# Patient Record
Sex: MALE | Race: ASIAN | NOT HISPANIC OR LATINO
[De-identification: names, ages, dates, MRNs, and addresses within clinical notes are randomized per-mention and may not be internally consistent; named-entity substitution may affect disease eponyms.]

---

## 2017-05-01 ENCOUNTER — RX RENEWAL (OUTPATIENT)
Age: 57
End: 2017-05-01

## 2017-11-28 ENCOUNTER — LABORATORY RESULT (OUTPATIENT)
Age: 57
End: 2017-11-28

## 2017-11-28 ENCOUNTER — APPOINTMENT (OUTPATIENT)
Dept: INTERNAL MEDICINE | Facility: CLINIC | Age: 57
End: 2017-11-28
Payer: COMMERCIAL

## 2017-11-28 VITALS
WEIGHT: 151 LBS | SYSTOLIC BLOOD PRESSURE: 124 MMHG | BODY MASS INDEX: 23.7 KG/M2 | DIASTOLIC BLOOD PRESSURE: 60 MMHG | OXYGEN SATURATION: 99 % | HEIGHT: 67 IN | HEART RATE: 72 BPM | TEMPERATURE: 97.8 F

## 2017-11-28 LAB
APPEARANCE: CLEAR
BACTERIA: NEGATIVE
BILIRUBIN URINE: NEGATIVE
BLOOD URINE: NEGATIVE
CHOLEST SERPL-MCNC: 172 MG/DL
CHOLEST/HDLC SERPL: 3.6 RATIO
COLOR: YELLOW
GLUCOSE QUALITATIVE U: NEGATIVE MG/DL
HDLC SERPL-MCNC: 48 MG/DL
HYALINE CASTS: 2 /LPF
KETONES URINE: NEGATIVE
LDLC SERPL CALC-MCNC: 89 MG/DL
LEUKOCYTE ESTERASE URINE: NEGATIVE
MICROSCOPIC-UA: NORMAL
NITRITE URINE: NEGATIVE
PH URINE: 5.5
PROTEIN URINE: NEGATIVE MG/DL
PSA SERPL-MCNC: 1.31 NG/ML
RED BLOOD CELLS URINE: 4 /HPF
SPECIFIC GRAVITY URINE: 1.03
SQUAMOUS EPITHELIAL CELLS: 1 /HPF
T3RU NFR SERPL: 1.04 INDEX
T4 SERPL-MCNC: 6.8 UG/DL
TRIGL SERPL-MCNC: 173 MG/DL
TSH SERPL-ACNC: 1 UIU/ML
URATE SERPL-MCNC: 7.4 MG/DL
UROBILINOGEN URINE: NEGATIVE MG/DL
WHITE BLOOD CELLS URINE: 1 /HPF

## 2017-11-28 PROCEDURE — G0008: CPT

## 2017-11-28 PROCEDURE — 36415 COLL VENOUS BLD VENIPUNCTURE: CPT

## 2017-11-28 PROCEDURE — 99396 PREV VISIT EST AGE 40-64: CPT | Mod: 25

## 2017-11-28 PROCEDURE — 93000 ELECTROCARDIOGRAM COMPLETE: CPT

## 2017-11-28 PROCEDURE — 90686 IIV4 VACC NO PRSV 0.5 ML IM: CPT

## 2017-11-30 LAB — HBA1C MFR BLD HPLC: 5 %

## 2017-12-11 LAB
ALBUMIN SERPL ELPH-MCNC: 4.7 G/DL
ALP BLD-CCNC: 63 U/L
ALT SERPL-CCNC: 39 U/L
ANION GAP SERPL CALC-SCNC: 13 MMOL/L
AST SERPL-CCNC: 32 U/L
BASOPHILS # BLD AUTO: 0.02 K/UL
BASOPHILS NFR BLD AUTO: 0.3 %
BILIRUB SERPL-MCNC: 0.7 MG/DL
BUN SERPL-MCNC: 18 MG/DL
CALCIUM SERPL-MCNC: 10.2 MG/DL
CHLORIDE SERPL-SCNC: 103 MMOL/L
CO2 SERPL-SCNC: 28 MMOL/L
CREAT SERPL-MCNC: 1.07 MG/DL
EOSINOPHIL # BLD AUTO: 0.07 K/UL
EOSINOPHIL NFR BLD AUTO: 0.9 %
GLUCOSE SERPL-MCNC: 105 MG/DL
HCT VFR BLD CALC: 40.8 %
HGB BLD-MCNC: 13.5 G/DL
IMM GRANULOCYTES NFR BLD AUTO: 0.3 %
LYMPHOCYTES # BLD AUTO: 2.14 K/UL
LYMPHOCYTES NFR BLD AUTO: 27.6 %
MAN DIFF?: NORMAL
MCHC RBC-ENTMCNC: 30.3 PG
MCHC RBC-ENTMCNC: 33.1 GM/DL
MCV RBC AUTO: 91.5 FL
MONOCYTES # BLD AUTO: 0.38 K/UL
MONOCYTES NFR BLD AUTO: 4.9 %
NEUTROPHILS # BLD AUTO: 5.11 K/UL
NEUTROPHILS NFR BLD AUTO: 66 %
PLATELET # BLD AUTO: 264 K/UL
POTASSIUM SERPL-SCNC: 3.9 MMOL/L
PROT SERPL-MCNC: 7.5 G/DL
RBC # BLD: 4.46 M/UL
RBC # FLD: 12.5 %
SODIUM SERPL-SCNC: 144 MMOL/L
WBC # FLD AUTO: 7.74 K/UL

## 2018-03-08 ENCOUNTER — MEDICATION RENEWAL (OUTPATIENT)
Age: 58
End: 2018-03-08

## 2018-03-13 LAB — HEMOCCULT STL QL IA: NEGATIVE

## 2018-03-19 ENCOUNTER — RX RENEWAL (OUTPATIENT)
Age: 58
End: 2018-03-19

## 2018-10-18 ENCOUNTER — APPOINTMENT (OUTPATIENT)
Dept: INTERNAL MEDICINE | Facility: CLINIC | Age: 58
End: 2018-10-18
Payer: COMMERCIAL

## 2018-10-18 VITALS
HEART RATE: 80 BPM | BODY MASS INDEX: 23.42 KG/M2 | SYSTOLIC BLOOD PRESSURE: 120 MMHG | DIASTOLIC BLOOD PRESSURE: 70 MMHG | WEIGHT: 151 LBS | TEMPERATURE: 99.1 F | HEIGHT: 67.5 IN | OXYGEN SATURATION: 95 %

## 2018-10-18 PROCEDURE — 99213 OFFICE O/P EST LOW 20 MIN: CPT

## 2018-10-18 NOTE — REVIEW OF SYSTEMS
[Fatigue] : fatigue [Nasal Discharge] : nasal discharge [Cough] : cough [Fever] : no fever [Chills] : no chills [Earache] : no earache [Sore Throat] : no sore throat [Chest Pain] : no chest pain [Shortness Of Breath] : no shortness of breath [Abdominal Pain] : no abdominal pain [Nausea] : no nausea [Diarrhea] : no diarrhea

## 2018-10-18 NOTE — PHYSICAL EXAM
[No Acute Distress] : no acute distress [Well Nourished] : well nourished [Well Developed] : well developed [Normal Oropharynx] : the oropharynx was normal [Normal TMs] : both tympanic membranes were normal [Supple] : supple [No Lymphadenopathy] : no lymphadenopathy [No Respiratory Distress] : no respiratory distress  [Clear to Auscultation] : lungs were clear to auscultation bilaterally [Normal Rate] : normal rate  [Normal S1, S2] : normal S1 and S2 [No Murmur] : no murmur heard [No Edema] : there was no peripheral edema [Soft] : abdomen soft [Non Tender] : non-tender

## 2018-10-18 NOTE — HISTORY OF PRESENT ILLNESS
[FreeTextEntry8] : Pt comes for a sick visit. Former patient of Dr. Abraham who recently retired. \par \par He started feeling sick 1 day after a business trip to [place] last week. He developed nasal congestion, cough with green sputum. He had a low grade fever of 99.2 over the past weekend. No fevers since then. Sx worse in morning but improves during day. No sore throat or sinus pressure. He was taking tylenol flu, dayquil, and mucinex but stopped them. He denies sick contacts.

## 2018-10-18 NOTE — ASSESSMENT
[FreeTextEntry1] : Patient with URI.\par \par Send Biaxin for 1 week. Hold simvastatin during antibiotic use.\par \par Suggest Coridicin HBP given HTN.\par \par Rest, hydrate well. NSAID's PRN. \par \par Call back office with worsening sx.\par \par RTO in 11/18 for annual physical.

## 2019-01-02 ENCOUNTER — MEDICATION RENEWAL (OUTPATIENT)
Age: 59
End: 2019-01-02

## 2019-01-03 ENCOUNTER — MEDICATION RENEWAL (OUTPATIENT)
Age: 59
End: 2019-01-03

## 2019-01-25 ENCOUNTER — APPOINTMENT (OUTPATIENT)
Dept: INTERNAL MEDICINE | Facility: CLINIC | Age: 59
End: 2019-01-25
Payer: COMMERCIAL

## 2019-01-25 VITALS
HEART RATE: 118 BPM | OXYGEN SATURATION: 92 % | HEIGHT: 67.5 IN | BODY MASS INDEX: 23.27 KG/M2 | TEMPERATURE: 102.7 F | SYSTOLIC BLOOD PRESSURE: 162 MMHG | DIASTOLIC BLOOD PRESSURE: 70 MMHG | WEIGHT: 150 LBS

## 2019-01-25 PROCEDURE — 87804 INFLUENZA ASSAY W/OPTIC: CPT | Mod: QW

## 2019-01-25 PROCEDURE — 99214 OFFICE O/P EST MOD 30 MIN: CPT | Mod: 25

## 2019-01-25 RX ORDER — CLARITHROMYCIN 500 MG/1
500 TABLET, FILM COATED ORAL
Qty: 14 | Refills: 0 | Status: DISCONTINUED | COMMUNITY
Start: 2018-10-18 | End: 2019-01-25

## 2019-01-25 NOTE — PHYSICAL EXAM
[No Acute Distress] : no acute distress [Normal Oropharynx] : the oropharynx was normal [Normal TMs] : both tympanic membranes were normal [Supple] : supple [No Lymphadenopathy] : no lymphadenopathy [No Respiratory Distress] : no respiratory distress  [Clear to Auscultation] : lungs were clear to auscultation bilaterally [Normal Rate] : normal rate  [Normal S1, S2] : normal S1 and S2 [No Murmur] : no murmur heard [No Edema] : there was no peripheral edema [Soft] : abdomen soft [Non Tender] : non-tender [Ill-Appearing] : ill-appearing [No CVA Tenderness] : no CVA  tenderness [No Spinal Tenderness] : no spinal tenderness [No Joint Swelling] : no joint swelling [No Rash] : no rash [Normal Gait] : normal gait [Normal Mood] : the mood was normal [de-identified] : OP clear, no sinus tenderness  [de-identified] : warm to touch

## 2019-01-25 NOTE — REVIEW OF SYSTEMS
[Fatigue] : fatigue [Nasal Discharge] : nasal discharge [Cough] : cough [Fever] : fever [Chills] : chills [Earache] : earache [Postnasal Drip] : no postnasal drip [Sore Throat] : no sore throat [Chest Pain] : no chest pain [Shortness Of Breath] : no shortness of breath [Abdominal Pain] : no abdominal pain [Nausea] : no nausea [Diarrhea] : no diarrhea [Dysuria] : no dysuria [Frequency] : no frequency [Joint Pain] : no joint pain [Muscle Pain] : muscle pain [Skin Rash] : no skin rash [Headache] : no headache [Dizziness] : no dizziness [Unsteady Walk] : no ataxia

## 2019-01-25 NOTE — ASSESSMENT
[FreeTextEntry1] : 1. Moderate fever with cough and nasal congestion. Lungs clear on exam. Rapid flu testing was negative. Will send for CXR to r/o infiltrate. Empirically treat with Biaxin. Continue Robitussin-DM. Rx given for Ibuprofen 600 mg PRN for fever and or aches. Rest and hydrate well. \par \par 2. The BP is elevated as he held the irbesartan-hctz 3 days ago. Advised to resume medication. Low salt diet. \par \par RTO few weeks for annual physical.

## 2019-01-25 NOTE — HISTORY OF PRESENT ILLNESS
[FreeTextEntry8] : Patient comes for an acute visit. \par \par He is here for evaluation of fatigue, body aches, cough, runny nose, and headache x 1 week. No sore throat. He has intense nasal congestion. Cough productive of green sputum. No cp or SOB. He had chills and sweats at home. He did not receive flu vaccine. No recent travel. Was exposed to sick colleagues at a networking event last week. He is taking Robitussin-DM and Dayquil. \par

## 2019-01-29 ENCOUNTER — OUTPATIENT (OUTPATIENT)
Dept: OUTPATIENT SERVICES | Facility: HOSPITAL | Age: 59
LOS: 1 days | End: 2019-01-29
Payer: COMMERCIAL

## 2019-01-29 ENCOUNTER — APPOINTMENT (OUTPATIENT)
Dept: RADIOLOGY | Facility: CLINIC | Age: 59
End: 2019-01-29
Payer: COMMERCIAL

## 2019-01-29 DIAGNOSIS — Z00.8 ENCOUNTER FOR OTHER GENERAL EXAMINATION: ICD-10-CM

## 2019-01-29 PROCEDURE — 71046 X-RAY EXAM CHEST 2 VIEWS: CPT | Mod: 26

## 2019-01-29 PROCEDURE — 71046 X-RAY EXAM CHEST 2 VIEWS: CPT

## 2019-03-07 ENCOUNTER — APPOINTMENT (OUTPATIENT)
Dept: INTERNAL MEDICINE | Facility: CLINIC | Age: 59
End: 2019-03-07
Payer: COMMERCIAL

## 2019-03-07 VITALS
SYSTOLIC BLOOD PRESSURE: 132 MMHG | OXYGEN SATURATION: 98 % | WEIGHT: 148 LBS | TEMPERATURE: 99.2 F | DIASTOLIC BLOOD PRESSURE: 84 MMHG | BODY MASS INDEX: 22.96 KG/M2 | HEART RATE: 98 BPM | HEIGHT: 67.5 IN

## 2019-03-07 DIAGNOSIS — R09.82 POSTNASAL DRIP: ICD-10-CM

## 2019-03-07 PROCEDURE — 99213 OFFICE O/P EST LOW 20 MIN: CPT

## 2019-03-07 RX ORDER — CLARITHROMYCIN 500 MG/1
500 TABLET, FILM COATED ORAL
Qty: 20 | Refills: 0 | Status: DISCONTINUED | COMMUNITY
Start: 2019-01-25 | End: 2019-03-07

## 2019-03-07 RX ORDER — IBUPROFEN 600 MG/1
600 TABLET, FILM COATED ORAL
Qty: 60 | Refills: 1 | Status: DISCONTINUED | COMMUNITY
Start: 2019-01-25 | End: 2019-03-07

## 2019-03-07 NOTE — REVIEW OF SYSTEMS
[Fever] : no fever [Chills] : no chills [Fatigue] : no fatigue [Earache] : no earache [Postnasal Drip] : postnasal drip [Nasal Discharge] : no nasal discharge [Sore Throat] : no sore throat [Chest Pain] : no chest pain [Shortness Of Breath] : no shortness of breath [Cough] : no cough [Abdominal Pain] : no abdominal pain [Nausea] : no nausea [Diarrhea] : no diarrhea [Dysuria] : no dysuria [Frequency] : no frequency [Joint Pain] : no joint pain [Muscle Pain] : no muscle pain [Skin Rash] : no skin rash [Headache] : no headache [Dizziness] : no dizziness [Unsteady Walk] : no ataxia

## 2019-03-07 NOTE — ASSESSMENT
[FreeTextEntry1] : Right middle lobe pneumonia in late January. Clinically improved after taking Biaxin. Repeat CXR to ensure resolution of infiltrate. \par \par He has a postnasal drip. Continue Flonase. Can use robitussin if develops cough. His fatigue does not appear significant, may be residual from recent pneumonia.\par \par RTO few months for annual exam.

## 2019-03-07 NOTE — PHYSICAL EXAM
[No Acute Distress] : no acute distress [Normal Oropharynx] : the oropharynx was normal [Normal TMs] : both tympanic membranes were normal [Supple] : supple [No Lymphadenopathy] : no lymphadenopathy [No Respiratory Distress] : no respiratory distress  [Clear to Auscultation] : lungs were clear to auscultation bilaterally [Normal Rate] : normal rate  [Normal S1, S2] : normal S1 and S2 [No Murmur] : no murmur heard [No Edema] : there was no peripheral edema [Soft] : abdomen soft [Non Tender] : non-tender [No CVA Tenderness] : no CVA  tenderness [No Spinal Tenderness] : no spinal tenderness [No Joint Swelling] : no joint swelling [No Rash] : no rash [Normal Gait] : normal gait [Normal Mood] : the mood was normal [Normal Voice/Communication] : normal voice/communication [Regular Rhythm] : with a regular rhythm [de-identified] : OP clear, no sinus tenderness, ++PND

## 2019-03-07 NOTE — HISTORY OF PRESENT ILLNESS
[FreeTextEntry1] : Follow-up pneumonia [de-identified] : Patient comes for follow-up. Diagnosed with right middle lobe pneumonia at end of January. Took 10 day course of Biaxin. He now feels better. No further fevers. He developed a PND last week, clearing throat often. Feels somewhat fatigued since earlier this week. No further cough.

## 2019-04-30 ENCOUNTER — RX RENEWAL (OUTPATIENT)
Age: 59
End: 2019-04-30

## 2019-05-03 ENCOUNTER — APPOINTMENT (OUTPATIENT)
Dept: INTERNAL MEDICINE | Facility: CLINIC | Age: 59
End: 2019-05-03
Payer: COMMERCIAL

## 2019-05-03 ENCOUNTER — NON-APPOINTMENT (OUTPATIENT)
Age: 59
End: 2019-05-03

## 2019-05-03 VITALS
TEMPERATURE: 98.5 F | SYSTOLIC BLOOD PRESSURE: 126 MMHG | HEART RATE: 77 BPM | DIASTOLIC BLOOD PRESSURE: 78 MMHG | OXYGEN SATURATION: 97 % | WEIGHT: 151 LBS | HEIGHT: 67.5 IN | BODY MASS INDEX: 23.42 KG/M2

## 2019-05-03 DIAGNOSIS — Z82.49 FAMILY HISTORY OF ISCHEMIC HEART DISEASE AND OTHER DISEASES OF THE CIRCULATORY SYSTEM: ICD-10-CM

## 2019-05-03 DIAGNOSIS — Z12.11 ENCOUNTER FOR SCREENING FOR MALIGNANT NEOPLASM OF COLON: ICD-10-CM

## 2019-05-03 DIAGNOSIS — Z87.442 PERSONAL HISTORY OF URINARY CALCULI: ICD-10-CM

## 2019-05-03 DIAGNOSIS — Z23 ENCOUNTER FOR IMMUNIZATION: ICD-10-CM

## 2019-05-03 DIAGNOSIS — Z83.3 FAMILY HISTORY OF DIABETES MELLITUS: ICD-10-CM

## 2019-05-03 DIAGNOSIS — J18.9 PNEUMONIA, UNSPECIFIED ORGANISM: ICD-10-CM

## 2019-05-03 PROCEDURE — 90471 IMMUNIZATION ADMIN: CPT

## 2019-05-03 PROCEDURE — 36415 COLL VENOUS BLD VENIPUNCTURE: CPT

## 2019-05-03 PROCEDURE — 93000 ELECTROCARDIOGRAM COMPLETE: CPT

## 2019-05-03 PROCEDURE — 99396 PREV VISIT EST AGE 40-64: CPT | Mod: 25

## 2019-05-03 PROCEDURE — G0444 DEPRESSION SCREEN ANNUAL: CPT

## 2019-05-03 PROCEDURE — 90750 HZV VACC RECOMBINANT IM: CPT

## 2019-05-03 RX ORDER — FLUTICASONE PROPIONATE 50 UG/1
50 SPRAY, METERED NASAL
Qty: 1 | Refills: 0 | Status: DISCONTINUED | COMMUNITY
Start: 2019-01-31 | End: 2019-05-03

## 2019-05-03 NOTE — PHYSICAL EXAM
[No Acute Distress] : no acute distress [Well Nourished] : well nourished [Well Developed] : well developed [Well-Appearing] : well-appearing [Normal Sclera/Conjunctiva] : normal sclera/conjunctiva [PERRL] : pupils equal round and reactive to light [EOMI] : extraocular movements intact [Normal Outer Ear/Nose] : the outer ears and nose were normal in appearance [Normal Oropharynx] : the oropharynx was normal [No JVD] : no jugular venous distention [Supple] : supple [No Lymphadenopathy] : no lymphadenopathy [Thyroid Normal, No Nodules] : the thyroid was normal and there were no nodules present [No Respiratory Distress] : no respiratory distress  [Clear to Auscultation] : lungs were clear to auscultation bilaterally [No Accessory Muscle Use] : no accessory muscle use [Regular Rhythm] : with a regular rhythm [Normal Rate] : normal rate  [Normal S1, S2] : normal S1 and S2 [No Murmur] : no murmur heard [No Carotid Bruits] : no carotid bruits [No Varicosities] : no varicosities [Pedal Pulses Present] : the pedal pulses are present [No Edema] : there was no peripheral edema [Soft] : abdomen soft [Non Tender] : non-tender [Non-distended] : non-distended [No Masses] : no abdominal mass palpated [No HSM] : no HSM [Normal Bowel Sounds] : normal bowel sounds [Normal Posterior Cervical Nodes] : no posterior cervical lymphadenopathy [Normal Anterior Cervical Nodes] : no anterior cervical lymphadenopathy [No Spinal Tenderness] : no spinal tenderness [No CVA Tenderness] : no CVA  tenderness [No Joint Swelling] : no joint swelling [Grossly Normal Strength/Tone] : grossly normal strength/tone [Normal Gait] : normal gait [No Rash] : no rash [Coordination Grossly Intact] : coordination grossly intact [No Focal Deficits] : no focal deficits [Deep Tendon Reflexes (DTR)] : deep tendon reflexes were 2+ and symmetric [Normal Affect] : the affect was normal [Normal Insight/Judgement] : insight and judgment were intact [Normal Voice/Communication] : normal voice/communication [Normal TMs] : both tympanic membranes were normal [Normal Appearance] : normal in appearance [Penis Abnormality] : normal circumcised penis [Testes Tenderness] : no tenderness of the testes [Scrotum] : the scrotum was normal [Testes Mass (___cm)] : there were no testicular masses [Prostate Enlargement] : the prostate was not enlarged [Prostate Tenderness] : the prostate was not tender [Normal rectal exam] : was normal [External Hemorrhoid] : an external hemorrhoid was present [Normal] : was normal [Normal Mood] : the mood was normal [Occult Blood Positive] : was negative for occult blood [Gross Blood] : no gross blood [Fecal Impaction] : no fecal impaction was present

## 2019-05-03 NOTE — HISTORY OF PRESENT ILLNESS
[de-identified] : Patient comes for an annual exam. Last physical was in 11/2017.\par \par Hx of HTN - on Irbesartan/HCTZ.\par Hx of hyperlipidemia - on Zocor and Niacin.\par \par He mentions having history of CAD s/p stent x 1 around 2011. He is followed by Dr. Marquez Haas but was last seen about 3-4 yr ago. \par \par He had RML pneumonia in 1/19 - was ordered for repeat CXR in 3/19 but never went for study. He feels well today, no complaints.\par \par

## 2019-05-03 NOTE — ASSESSMENT
[FreeTextEntry1] : 1. HCM: check routine labs as below. EKG is normal. Discussed proper diet, exercise, and weight maintenance. Immunizations discussed - for annual flu vaccine in Fall, he is interested in Shingrix and was administered today, will receive 2nd shot in 2-6 months. Prostate cancer screening with PSA. No prior screening colonoscopy, had negative FIT in 11/17 - he will schedule consultation with Dr. Pinto at [facility]. He is monogamous with wife, no sexual or ED issues.  \par \par 2. Hypertension: BP is controlled on Irbesartan-HCTZ. Low salt diet.\par \par 3. Hyperlipidemia: check fasting lipids and chemistries. Continue Zocor and Niacin.\par \par 4. History of CAD s/p stent x 1 around 2011. He will follow-up with Dr. Marquez Haas as he is overdue. EKG normal.\par \par 5. He had RML pneumonia in 1/19 - never went for repeat CXR last visit. Will reorder CXR. He is asymptomatic.\par \par 6. History of kidney stones s/p lithotripsy. Quiescent. Check UA.\par \par RTO in 6 months. \par

## 2019-05-14 DIAGNOSIS — R97.20 ELEVATED PROSTATE, SPECIFIC ANTIGEN [PSA]: ICD-10-CM

## 2019-05-14 LAB
25(OH)D3 SERPL-MCNC: 19.8 NG/ML
ALBUMIN SERPL ELPH-MCNC: 4.7 G/DL
ALP BLD-CCNC: 63 U/L
ALT SERPL-CCNC: 35 U/L
ANION GAP SERPL CALC-SCNC: 14 MMOL/L
APPEARANCE: CLEAR
AST SERPL-CCNC: 28 U/L
BACTERIA: NEGATIVE
BASOPHILS # BLD AUTO: 0.02 K/UL
BASOPHILS NFR BLD AUTO: 0.3 %
BILIRUB SERPL-MCNC: 0.8 MG/DL
BILIRUBIN URINE: NEGATIVE
BLOOD URINE: NEGATIVE
BUN SERPL-MCNC: 14 MG/DL
CALCIUM SERPL-MCNC: 9.7 MG/DL
CHLORIDE SERPL-SCNC: 103 MMOL/L
CHOLEST SERPL-MCNC: 169 MG/DL
CHOLEST/HDLC SERPL: 3.7 RATIO
CO2 SERPL-SCNC: 28 MMOL/L
COLOR: NORMAL
CREAT SERPL-MCNC: 1.03 MG/DL
EOSINOPHIL # BLD AUTO: 0.06 K/UL
EOSINOPHIL NFR BLD AUTO: 1 %
ESTIMATED AVERAGE GLUCOSE: 97 MG/DL
GLUCOSE QUALITATIVE U: NEGATIVE
GLUCOSE SERPL-MCNC: 107 MG/DL
HBA1C MFR BLD HPLC: 5 %
HCT VFR BLD CALC: 41.5 %
HDLC SERPL-MCNC: 46 MG/DL
HGB BLD-MCNC: 13.4 G/DL
HYALINE CASTS: 0 /LPF
IMM GRANULOCYTES NFR BLD AUTO: 0.2 %
KETONES URINE: NEGATIVE
LDLC SERPL CALC-MCNC: 100 MG/DL
LEUKOCYTE ESTERASE URINE: NEGATIVE
LYMPHOCYTES # BLD AUTO: 1.39 K/UL
LYMPHOCYTES NFR BLD AUTO: 23.8 %
MAN DIFF?: NORMAL
MCHC RBC-ENTMCNC: 30.6 PG
MCHC RBC-ENTMCNC: 32.3 GM/DL
MCV RBC AUTO: 94.7 FL
MICROSCOPIC-UA: NORMAL
MONOCYTES # BLD AUTO: 0.42 K/UL
MONOCYTES NFR BLD AUTO: 7.2 %
NEUTROPHILS # BLD AUTO: 3.94 K/UL
NEUTROPHILS NFR BLD AUTO: 67.5 %
NITRITE URINE: NEGATIVE
PH URINE: 7
PLATELET # BLD AUTO: 263 K/UL
POTASSIUM SERPL-SCNC: 4.2 MMOL/L
PROT SERPL-MCNC: 6.9 G/DL
PROTEIN URINE: NEGATIVE
PSA SERPL-MCNC: 6.74 NG/ML
RBC # BLD: 4.38 M/UL
RBC # FLD: 12.5 %
RED BLOOD CELLS URINE: 0 /HPF
SODIUM SERPL-SCNC: 145 MMOL/L
SPECIFIC GRAVITY URINE: 1.02
SQUAMOUS EPITHELIAL CELLS: 0 /HPF
T4 FREE SERPL-MCNC: 1.3 NG/DL
TRIGL SERPL-MCNC: 115 MG/DL
TSH SERPL-ACNC: 1.19 UIU/ML
UROBILINOGEN URINE: NORMAL
WBC # FLD AUTO: 5.84 K/UL
WHITE BLOOD CELLS URINE: 0 /HPF

## 2019-05-18 ENCOUNTER — APPOINTMENT (OUTPATIENT)
Dept: INTERNAL MEDICINE | Facility: CLINIC | Age: 59
End: 2019-05-18
Payer: COMMERCIAL

## 2019-05-18 PROCEDURE — 36415 COLL VENOUS BLD VENIPUNCTURE: CPT

## 2019-05-20 LAB
PSA FREE FLD-MCNC: 20 %
PSA FREE SERPL-MCNC: 0.71 NG/ML
PSA SERPL-MCNC: 3.62 NG/ML

## 2019-06-23 ENCOUNTER — RX RENEWAL (OUTPATIENT)
Age: 59
End: 2019-06-23

## 2019-10-14 ENCOUNTER — APPOINTMENT (OUTPATIENT)
Dept: INTERNAL MEDICINE | Facility: CLINIC | Age: 59
End: 2019-10-14
Payer: COMMERCIAL

## 2019-10-14 VITALS
TEMPERATURE: 98.6 F | SYSTOLIC BLOOD PRESSURE: 160 MMHG | HEART RATE: 107 BPM | OXYGEN SATURATION: 94 % | DIASTOLIC BLOOD PRESSURE: 80 MMHG

## 2019-10-14 DIAGNOSIS — Z87.09 PERSONAL HISTORY OF OTHER DISEASES OF THE RESPIRATORY SYSTEM: ICD-10-CM

## 2019-10-14 PROCEDURE — 99214 OFFICE O/P EST MOD 30 MIN: CPT

## 2019-10-14 NOTE — HISTORY OF PRESENT ILLNESS
[FreeTextEntry8] : ARIS MAGDALENO is a 58 yo man with productive cough of green phlegm, sore throat worsening for 6 days.  Denies fever, chest pain, sob.\par \par was treated for pneumonia in the past\par \par Hypertension, hypercholesterolemia stable on current meds

## 2019-10-14 NOTE — PHYSICAL EXAM
[No Acute Distress] : no acute distress [Well Nourished] : well nourished [Well Developed] : well developed [Well-Appearing] : well-appearing [Normal Sclera/Conjunctiva] : normal sclera/conjunctiva [Normal Outer Ear/Nose] : the outer ears and nose were normal in appearance [No JVD] : no jugular venous distention [Normal TMs] : both tympanic membranes were normal [Supple] : supple [No Accessory Muscle Use] : no accessory muscle use [No Respiratory Distress] : no respiratory distress  [Clear to Auscultation] : lungs were clear to auscultation bilaterally [Normal Rate] : normal rate  [Regular Rhythm] : with a regular rhythm [No Murmur] : no murmur heard [Normal S1, S2] : normal S1 and S2 [No Rash] : no rash [Normal Gait] : normal gait [Speech Grossly Normal] : speech grossly normal [Memory Grossly Normal] : memory grossly normal [Normal Affect] : the affect was normal [Alert and Oriented x3] : oriented to person, place, and time [Normal Mood] : the mood was normal [Normal Insight/Judgement] : insight and judgment were intact [de-identified] : red pharynx. no exudate

## 2019-10-14 NOTE — REVIEW OF SYSTEMS
[Earache] : no earache [Fever] : no fever [Chest Pain] : no chest pain [Shortness Of Breath] : no shortness of breath [Headache] : no headache

## 2019-10-21 ENCOUNTER — RX RENEWAL (OUTPATIENT)
Age: 59
End: 2019-10-21

## 2019-10-21 ENCOUNTER — CHART COPY (OUTPATIENT)
Age: 59
End: 2019-10-21

## 2019-12-22 ENCOUNTER — RX RENEWAL (OUTPATIENT)
Age: 59
End: 2019-12-22

## 2020-07-09 ENCOUNTER — APPOINTMENT (OUTPATIENT)
Dept: INTERNAL MEDICINE | Facility: CLINIC | Age: 60
End: 2020-07-09

## 2020-08-12 ENCOUNTER — NON-APPOINTMENT (OUTPATIENT)
Age: 60
End: 2020-08-12

## 2020-08-12 ENCOUNTER — APPOINTMENT (OUTPATIENT)
Dept: INTERNAL MEDICINE | Facility: CLINIC | Age: 60
End: 2020-08-12
Payer: COMMERCIAL

## 2020-08-12 VITALS
HEIGHT: 67.5 IN | DIASTOLIC BLOOD PRESSURE: 86 MMHG | TEMPERATURE: 97.3 F | WEIGHT: 152 LBS | SYSTOLIC BLOOD PRESSURE: 138 MMHG | HEART RATE: 75 BPM | BODY MASS INDEX: 23.58 KG/M2 | OXYGEN SATURATION: 97 %

## 2020-08-12 PROCEDURE — 36415 COLL VENOUS BLD VENIPUNCTURE: CPT

## 2020-08-12 PROCEDURE — 93000 ELECTROCARDIOGRAM COMPLETE: CPT

## 2020-08-12 PROCEDURE — 82270 OCCULT BLOOD FECES: CPT

## 2020-08-12 PROCEDURE — 90750 HZV VACC RECOMBINANT IM: CPT

## 2020-08-12 PROCEDURE — G0444 DEPRESSION SCREEN ANNUAL: CPT | Mod: 59

## 2020-08-12 PROCEDURE — 90471 IMMUNIZATION ADMIN: CPT

## 2020-08-12 PROCEDURE — 99396 PREV VISIT EST AGE 40-64: CPT | Mod: 25

## 2020-08-12 RX ORDER — CLARITHROMYCIN 500 MG/1
500 TABLET, FILM COATED ORAL TWICE DAILY
Qty: 20 | Refills: 0 | Status: DISCONTINUED | COMMUNITY
Start: 2019-10-14 | End: 2020-08-12

## 2020-08-12 NOTE — REVIEW OF SYSTEMS
[Vision Problems] : no vision problems [Fever] : no fever [Nasal Discharge] : no nasal discharge [Shortness Of Breath] : no shortness of breath [Chest Pain] : no chest pain [Abdominal Pain] : no abdominal pain [Headache] : no headache

## 2020-08-12 NOTE — HISTORY OF PRESENT ILLNESS
[FreeTextEntry1] : Annual Physical [de-identified] : ARIS MAGDALENO is a 59 yo man with CAD s/p stent 2011, hypertension, hypercholesterolemia here for a physical.  He has been well overall.\par \par Overdue for cardiology.  Will do ifobt\par \par The patient is  with 2 children.  He works as a  in private practice.  He would have no difficulty walking 4 to 6 blocks or 2 flights of stairs.

## 2020-08-12 NOTE — PHYSICAL EXAM
[Well Developed] : well developed [Well Nourished] : well nourished [No Acute Distress] : no acute distress [Normal Sclera/Conjunctiva] : normal sclera/conjunctiva [Well-Appearing] : well-appearing [EOMI] : extraocular movements intact [PERRL] : pupils equal round and reactive to light [Normal Outer Ear/Nose] : the outer ears and nose were normal in appearance [Normal Oropharynx] : the oropharynx was normal [Normal Nasal Mucosa] : the nasal mucosa was normal [Normal TMs] : both tympanic membranes were normal [No Lymphadenopathy] : no lymphadenopathy [Supple] : supple [No Respiratory Distress] : no respiratory distress  [Thyroid Normal, No Nodules] : the thyroid was normal and there were no nodules present [No Accessory Muscle Use] : no accessory muscle use [Normal Rate] : normal rate  [Clear to Auscultation] : lungs were clear to auscultation bilaterally [Regular Rhythm] : with a regular rhythm [Normal S1, S2] : normal S1 and S2 [No Murmur] : no murmur heard [No Carotid Bruits] : no carotid bruits [Non Tender] : non-tender [No Edema] : there was no peripheral edema [Soft] : abdomen soft [Non-distended] : non-distended [No Masses] : no abdominal mass palpated [No Mass] : no mass [Normal Sphincter Tone] : normal sphincter tone [Normal Bowel Sounds] : normal bowel sounds [Normal Supraclavicular Nodes] : no supraclavicular lymphadenopathy [Stool Occult Blood] : stool negative for occult blood [Normal Axillary Nodes] : no axillary lymphadenopathy [Normal Posterior Cervical Nodes] : no posterior cervical lymphadenopathy [Normal Anterior Cervical Nodes] : no anterior cervical lymphadenopathy [No CVA Tenderness] : no CVA  tenderness [Coordination Grossly Intact] : coordination grossly intact [No Focal Deficits] : no focal deficits [Normal Gait] : normal gait [Deep Tendon Reflexes (DTR)] : deep tendon reflexes were 2+ and symmetric [Speech Grossly Normal] : speech grossly normal [Memory Grossly Normal] : memory grossly normal [Normal Affect] : the affect was normal [Alert and Oriented x3] : oriented to person, place, and time [Normal Mood] : the mood was normal [Normal Insight/Judgement] : insight and judgment were intact

## 2020-08-12 NOTE — ASSESSMENT
[FreeTextEntry1] : HCM\par Labs today\par Consider urology if PSA elevated\par Advised colonoscopy or ifobt yearly\par continue current meds\par BP check in 6 months\par Shingrix #2 today
167.64

## 2020-08-12 NOTE — HEALTH RISK ASSESSMENT
[Good] : ~his/her~  mood as  good [] : No [No] : No [No falls in past year] : Patient reported no falls in the past year [de-identified] : active [de-identified] : regular [None] : None [With Family] : lives with family [Feels Safe at Home] : Feels safe at home [Fully functional (bathing, dressing, toileting, transferring, walking, feeding)] : Fully functional (bathing, dressing, toileting, transferring, walking, feeding) [Employed] : employed [Fully functional (using the telephone, shopping, preparing meals, housekeeping, doing laundry, using] : Fully functional and needs no help or supervision to perform IADLs (using the telephone, shopping, preparing meals, housekeeping, doing laundry, using transportation, managing medications and managing finances) [Reports changes in hearing] : Reports no changes in hearing [Reports changes in vision] : Reports no changes in vision [Reports changes in dental health] : Reports no changes in dental health [Carbon Monoxide Detector] : carbon monoxide detector [Smoke Detector] : smoke detector [Seat Belt] :  uses seat belt

## 2020-08-13 LAB
25(OH)D3 SERPL-MCNC: 24.5 NG/ML
ALBUMIN SERPL ELPH-MCNC: 5.1 G/DL
ALP BLD-CCNC: 69 U/L
ALT SERPL-CCNC: 49 U/L
ANION GAP SERPL CALC-SCNC: 14 MMOL/L
APPEARANCE: CLEAR
AST SERPL-CCNC: 32 U/L
BASOPHILS # BLD AUTO: 0.02 K/UL
BASOPHILS NFR BLD AUTO: 0.3 %
BILIRUB SERPL-MCNC: 0.9 MG/DL
BILIRUBIN URINE: NEGATIVE
BLOOD URINE: NEGATIVE
BUN SERPL-MCNC: 13 MG/DL
CALCIUM SERPL-MCNC: 10 MG/DL
CHLORIDE SERPL-SCNC: 103 MMOL/L
CHOLEST SERPL-MCNC: 203 MG/DL
CHOLEST/HDLC SERPL: 4.4 RATIO
CO2 SERPL-SCNC: 28 MMOL/L
COLOR: YELLOW
CREAT SERPL-MCNC: 1.08 MG/DL
EOSINOPHIL # BLD AUTO: 0.04 K/UL
EOSINOPHIL NFR BLD AUTO: 0.7 %
ESTIMATED AVERAGE GLUCOSE: 91 MG/DL
GLUCOSE QUALITATIVE U: NEGATIVE
GLUCOSE SERPL-MCNC: 86 MG/DL
HBA1C MFR BLD HPLC: 4.8 %
HCT VFR BLD CALC: 42.8 %
HDLC SERPL-MCNC: 46 MG/DL
HGB BLD-MCNC: 13.4 G/DL
IMM GRANULOCYTES NFR BLD AUTO: 0.3 %
KETONES URINE: NEGATIVE
LDLC SERPL CALC-MCNC: 118 MG/DL
LEUKOCYTE ESTERASE URINE: NEGATIVE
LYMPHOCYTES # BLD AUTO: 1.45 K/UL
LYMPHOCYTES NFR BLD AUTO: 25 %
MAN DIFF?: NORMAL
MCHC RBC-ENTMCNC: 30.2 PG
MCHC RBC-ENTMCNC: 31.3 GM/DL
MCV RBC AUTO: 96.6 FL
MONOCYTES # BLD AUTO: 0.47 K/UL
MONOCYTES NFR BLD AUTO: 8.1 %
NEUTROPHILS # BLD AUTO: 3.8 K/UL
NEUTROPHILS NFR BLD AUTO: 65.6 %
NITRITE URINE: NEGATIVE
PH URINE: 6
PLATELET # BLD AUTO: 279 K/UL
POTASSIUM SERPL-SCNC: 4.1 MMOL/L
PROT SERPL-MCNC: 7.1 G/DL
PROTEIN URINE: NORMAL
PSA SERPL-MCNC: 1.35 NG/ML
RBC # BLD: 4.43 M/UL
RBC # FLD: 12.3 %
SARS-COV-2 IGG SERPL IA-ACNC: <3.8 AU/ML
SARS-COV-2 IGG SERPL QL IA: NEGATIVE
SODIUM SERPL-SCNC: 145 MMOL/L
SPECIFIC GRAVITY URINE: 1.02
T4 FREE SERPL-MCNC: 1.2 NG/DL
TRIGL SERPL-MCNC: 195 MG/DL
TSH SERPL-ACNC: 1.41 UIU/ML
UROBILINOGEN URINE: NORMAL
WBC # FLD AUTO: 5.8 K/UL

## 2020-11-11 ENCOUNTER — RX RENEWAL (OUTPATIENT)
Age: 60
End: 2020-11-11

## 2020-12-21 PROBLEM — Z87.09 HISTORY OF UPPER RESPIRATORY INFECTION: Status: RESOLVED | Noted: 2019-10-14 | Resolved: 2020-12-21

## 2021-02-09 ENCOUNTER — RX RENEWAL (OUTPATIENT)
Age: 61
End: 2021-02-09

## 2021-03-05 ENCOUNTER — TRANSCRIPTION ENCOUNTER (OUTPATIENT)
Age: 61
End: 2021-03-05

## 2021-05-10 ENCOUNTER — RX RENEWAL (OUTPATIENT)
Age: 61
End: 2021-05-10

## 2021-09-24 ENCOUNTER — APPOINTMENT (OUTPATIENT)
Dept: INTERNAL MEDICINE | Facility: CLINIC | Age: 61
End: 2021-09-24
Payer: COMMERCIAL

## 2021-09-24 ENCOUNTER — NON-APPOINTMENT (OUTPATIENT)
Age: 61
End: 2021-09-24

## 2021-09-24 VITALS
TEMPERATURE: 97.5 F | HEART RATE: 71 BPM | SYSTOLIC BLOOD PRESSURE: 140 MMHG | OXYGEN SATURATION: 98 % | DIASTOLIC BLOOD PRESSURE: 90 MMHG | HEIGHT: 67.5 IN

## 2021-09-24 PROCEDURE — 99396 PREV VISIT EST AGE 40-64: CPT | Mod: 25

## 2021-09-24 PROCEDURE — 90686 IIV4 VACC NO PRSV 0.5 ML IM: CPT

## 2021-09-24 PROCEDURE — 93000 ELECTROCARDIOGRAM COMPLETE: CPT | Mod: 59

## 2021-09-24 PROCEDURE — 36415 COLL VENOUS BLD VENIPUNCTURE: CPT

## 2021-09-24 PROCEDURE — G0008: CPT

## 2021-09-24 PROCEDURE — G0444 DEPRESSION SCREEN ANNUAL: CPT | Mod: 59

## 2021-09-24 NOTE — ASSESSMENT
[FreeTextEntry1] : HCM\par Labs today\par Advised colonoscopy or ifobt yearly\par continue current meds\par regular flu shot today\par BP check in 6 months\par Shingrix utd

## 2021-09-24 NOTE — PHYSICAL EXAM
[No Acute Distress] : no acute distress [Well Nourished] : well nourished [Well Developed] : well developed [Well-Appearing] : well-appearing [Normal Sclera/Conjunctiva] : normal sclera/conjunctiva [PERRL] : pupils equal round and reactive to light [EOMI] : extraocular movements intact [Normal Outer Ear/Nose] : the outer ears and nose were normal in appearance [Normal Oropharynx] : the oropharynx was normal [Normal TMs] : both tympanic membranes were normal [Normal Nasal Mucosa] : the nasal mucosa was normal [No Lymphadenopathy] : no lymphadenopathy [Supple] : supple [Thyroid Normal, No Nodules] : the thyroid was normal and there were no nodules present [No Respiratory Distress] : no respiratory distress  [No Accessory Muscle Use] : no accessory muscle use [Clear to Auscultation] : lungs were clear to auscultation bilaterally [Normal Rate] : normal rate  [Regular Rhythm] : with a regular rhythm [Normal S1, S2] : normal S1 and S2 [No Murmur] : no murmur heard [No Carotid Bruits] : no carotid bruits [No Edema] : there was no peripheral edema [Soft] : abdomen soft [Non Tender] : non-tender [Non-distended] : non-distended [No Masses] : no abdominal mass palpated [Normal Bowel Sounds] : normal bowel sounds [Normal Sphincter Tone] : normal sphincter tone [No Mass] : no mass [Normal Supraclavicular Nodes] : no supraclavicular lymphadenopathy [Normal Axillary Nodes] : no axillary lymphadenopathy [Normal Posterior Cervical Nodes] : no posterior cervical lymphadenopathy [Normal Anterior Cervical Nodes] : no anterior cervical lymphadenopathy [No CVA Tenderness] : no CVA  tenderness [Coordination Grossly Intact] : coordination grossly intact [No Focal Deficits] : no focal deficits [Normal Gait] : normal gait [Deep Tendon Reflexes (DTR)] : deep tendon reflexes were 2+ and symmetric [Speech Grossly Normal] : speech grossly normal [Memory Grossly Normal] : memory grossly normal [Normal Affect] : the affect was normal [Alert and Oriented x3] : oriented to person, place, and time [Normal Mood] : the mood was normal [Normal Insight/Judgement] : insight and judgment were intact [Stool Occult Blood] : stool negative for occult blood

## 2021-09-24 NOTE — HEALTH RISK ASSESSMENT
[Good] : ~his/her~  mood as  good [No] : In the past 12 months have you used drugs other than those required for medical reasons? No [No falls in past year] : Patient reported no falls in the past year [None] : None [With Family] : lives with family [Employed] : employed [Feels Safe at Home] : Feels safe at home [Fully functional (bathing, dressing, toileting, transferring, walking, feeding)] : Fully functional (bathing, dressing, toileting, transferring, walking, feeding) [Fully functional (using the telephone, shopping, preparing meals, housekeeping, doing laundry, using] : Fully functional and needs no help or supervision to perform IADLs (using the telephone, shopping, preparing meals, housekeeping, doing laundry, using transportation, managing medications and managing finances) [Smoke Detector] : smoke detector [Carbon Monoxide Detector] : carbon monoxide detector [Seat Belt] :  uses seat belt [] : No [de-identified] : active [de-identified] : regular [Reports changes in hearing] : Reports no changes in hearing [Reports changes in vision] : Reports no changes in vision [Reports changes in dental health] : Reports no changes in dental health

## 2021-09-24 NOTE — HISTORY OF PRESENT ILLNESS
[de-identified] : ARIS MAGDALENO is a 60 yo man with CAD s/p stent 2011, hypertension, hypercholesterolemia here for a physical.  He has been well overall.\par Did receive both Moderna covid 19 vaccines\par \par Overdue for cardiology.  Colonoscopy/ifobt advised\par \par The patient is  with 2 children.  He works as a  in private practice.  He would have no difficulty walking 4 to 6 blocks or 2 flights of stairs. [FreeTextEntry1] : Annual Physical

## 2021-09-28 LAB
25(OH)D3 SERPL-MCNC: 15.5 NG/ML
ALBUMIN SERPL ELPH-MCNC: 5.1 G/DL
ALP BLD-CCNC: 75 U/L
ALT SERPL-CCNC: 31 U/L
ANION GAP SERPL CALC-SCNC: 14 MMOL/L
APPEARANCE: CLEAR
AST SERPL-CCNC: 27 U/L
BASOPHILS # BLD AUTO: 0.02 K/UL
BASOPHILS NFR BLD AUTO: 0.3 %
BILIRUB SERPL-MCNC: 0.7 MG/DL
BILIRUBIN URINE: NEGATIVE
BLOOD URINE: NEGATIVE
BUN SERPL-MCNC: 18 MG/DL
CALCIUM SERPL-MCNC: 9.8 MG/DL
CHLORIDE SERPL-SCNC: 103 MMOL/L
CHOLEST SERPL-MCNC: 190 MG/DL
CO2 SERPL-SCNC: 27 MMOL/L
COLOR: NORMAL
CREAT SERPL-MCNC: 1.1 MG/DL
EOSINOPHIL # BLD AUTO: 0.06 K/UL
EOSINOPHIL NFR BLD AUTO: 0.9 %
ESTIMATED AVERAGE GLUCOSE: 97 MG/DL
GLUCOSE QUALITATIVE U: NEGATIVE
GLUCOSE SERPL-MCNC: 113 MG/DL
HBA1C MFR BLD HPLC: 5 %
HCT VFR BLD CALC: 42.9 %
HDLC SERPL-MCNC: 47 MG/DL
HGB BLD-MCNC: 14.3 G/DL
IMM GRANULOCYTES NFR BLD AUTO: 0.3 %
KETONES URINE: NEGATIVE
LDLC SERPL CALC-MCNC: 106 MG/DL
LEUKOCYTE ESTERASE URINE: NEGATIVE
LYMPHOCYTES # BLD AUTO: 1.62 K/UL
LYMPHOCYTES NFR BLD AUTO: 23.4 %
MAN DIFF?: NORMAL
MCHC RBC-ENTMCNC: 31.4 PG
MCHC RBC-ENTMCNC: 33.3 GM/DL
MCV RBC AUTO: 94.3 FL
MONOCYTES # BLD AUTO: 0.49 K/UL
MONOCYTES NFR BLD AUTO: 7.1 %
NEUTROPHILS # BLD AUTO: 4.72 K/UL
NEUTROPHILS NFR BLD AUTO: 68 %
NITRITE URINE: NEGATIVE
NONHDLC SERPL-MCNC: 143 MG/DL
PH URINE: 6
PLATELET # BLD AUTO: 286 K/UL
POTASSIUM SERPL-SCNC: 4.1 MMOL/L
PROT SERPL-MCNC: 7.2 G/DL
PROTEIN URINE: NORMAL
PSA SERPL-MCNC: 2.49 NG/ML
RBC # BLD: 4.55 M/UL
RBC # FLD: 11.9 %
SODIUM SERPL-SCNC: 143 MMOL/L
SPECIFIC GRAVITY URINE: 1.03
T4 FREE SERPL-MCNC: 1.4 NG/DL
TRIGL SERPL-MCNC: 187 MG/DL
TSH SERPL-ACNC: 0.9 UIU/ML
UROBILINOGEN URINE: NORMAL
VIT B12 SERPL-MCNC: 279 PG/ML
WBC # FLD AUTO: 6.93 K/UL

## 2021-09-28 RX ORDER — ERGOCALCIFEROL 1.25 MG/1
1.25 MG CAPSULE ORAL
Qty: 12 | Refills: 0 | Status: ACTIVE | COMMUNITY
Start: 2021-09-28 | End: 1900-01-01

## 2021-12-21 ENCOUNTER — RX RENEWAL (OUTPATIENT)
Age: 61
End: 2021-12-21

## 2021-12-21 RX ORDER — ERGOCALCIFEROL 1.25 MG/1
1.25 MG CAPSULE, LIQUID FILLED ORAL
Qty: 12 | Refills: 0 | Status: ACTIVE | COMMUNITY
Start: 2021-12-21 | End: 1900-01-01

## 2022-11-30 ENCOUNTER — APPOINTMENT (OUTPATIENT)
Dept: INTERNAL MEDICINE | Facility: CLINIC | Age: 62
End: 2022-11-30
Payer: COMMERCIAL

## 2022-11-30 VITALS
HEART RATE: 73 BPM | DIASTOLIC BLOOD PRESSURE: 82 MMHG | RESPIRATION RATE: 16 BRPM | BODY MASS INDEX: 24.96 KG/M2 | WEIGHT: 159 LBS | SYSTOLIC BLOOD PRESSURE: 126 MMHG | OXYGEN SATURATION: 96 % | HEIGHT: 67 IN | TEMPERATURE: 97.2 F

## 2022-11-30 DIAGNOSIS — Z23 ENCOUNTER FOR IMMUNIZATION: ICD-10-CM

## 2022-11-30 PROCEDURE — 36415 COLL VENOUS BLD VENIPUNCTURE: CPT

## 2022-11-30 PROCEDURE — 99214 OFFICE O/P EST MOD 30 MIN: CPT | Mod: 25

## 2022-12-01 LAB
25(OH)D3 SERPL-MCNC: 29.5 NG/ML
ALBUMIN SERPL ELPH-MCNC: 5.1 G/DL
ALP BLD-CCNC: 72 U/L
ALT SERPL-CCNC: 41 U/L
ANION GAP SERPL CALC-SCNC: 13 MMOL/L
APPEARANCE: CLEAR
AST SERPL-CCNC: 33 U/L
BASOPHILS # BLD AUTO: 0.02 K/UL
BASOPHILS NFR BLD AUTO: 0.3 %
BILIRUB SERPL-MCNC: 0.7 MG/DL
BILIRUBIN URINE: NEGATIVE
BLOOD URINE: NEGATIVE
BUN SERPL-MCNC: 14 MG/DL
CALCIUM SERPL-MCNC: 10.2 MG/DL
CHLORIDE SERPL-SCNC: 99 MMOL/L
CHOLEST SERPL-MCNC: 215 MG/DL
CO2 SERPL-SCNC: 27 MMOL/L
COLOR: NORMAL
CREAT SERPL-MCNC: 0.97 MG/DL
EGFR: 89 ML/MIN/1.73M2
EOSINOPHIL # BLD AUTO: 0.05 K/UL
EOSINOPHIL NFR BLD AUTO: 0.8 %
ESTIMATED AVERAGE GLUCOSE: 103 MG/DL
GLUCOSE QUALITATIVE U: NEGATIVE
GLUCOSE SERPL-MCNC: 147 MG/DL
HBA1C MFR BLD HPLC: 5.2 %
HCT VFR BLD CALC: 42.9 %
HDLC SERPL-MCNC: 45 MG/DL
HGB BLD-MCNC: 14.3 G/DL
IMM GRANULOCYTES NFR BLD AUTO: 0.3 %
KETONES URINE: NEGATIVE
LDLC SERPL CALC-MCNC: 103 MG/DL
LEUKOCYTE ESTERASE URINE: NEGATIVE
LYMPHOCYTES # BLD AUTO: 1.41 K/UL
LYMPHOCYTES NFR BLD AUTO: 22.2 %
MAN DIFF?: NORMAL
MCHC RBC-ENTMCNC: 30.8 PG
MCHC RBC-ENTMCNC: 33.3 GM/DL
MCV RBC AUTO: 92.5 FL
MONOCYTES # BLD AUTO: 0.43 K/UL
MONOCYTES NFR BLD AUTO: 6.8 %
NEUTROPHILS # BLD AUTO: 4.41 K/UL
NEUTROPHILS NFR BLD AUTO: 69.6 %
NITRITE URINE: NEGATIVE
NONHDLC SERPL-MCNC: 170 MG/DL
PH URINE: 6
PLATELET # BLD AUTO: 243 K/UL
POTASSIUM SERPL-SCNC: 4.1 MMOL/L
PROT SERPL-MCNC: 7.2 G/DL
PROTEIN URINE: NORMAL
PSA FREE SERPL-MCNC: 0.4 NG/ML
RBC # BLD: 4.64 M/UL
RBC # FLD: 12.6 %
SODIUM SERPL-SCNC: 140 MMOL/L
SPECIFIC GRAVITY URINE: 1.02
T4 FREE SERPL-MCNC: 1.4 NG/DL
TRIGL SERPL-MCNC: 330 MG/DL
TSH SERPL-ACNC: 0.85 UIU/ML
UROBILINOGEN URINE: NORMAL
VIT B12 SERPL-MCNC: 349 PG/ML
WBC # FLD AUTO: 6.34 K/UL

## 2022-12-01 NOTE — ASSESSMENT
[FreeTextEntry1] : Labs drawn in office today\par continue current meds\par regular flu shot today\par cardio fuv\par FOBT given\par cpx in 3 -4 months

## 2022-12-01 NOTE — HISTORY OF PRESENT ILLNESS
[FreeTextEntry1] : bp check [de-identified] : ARIS MAGDALENO is a 60 yo man with CAD s/p stent 2011, hypertension, hypercholesterolemia here for a bp check.  He has been well overall.\par \par Overdue for cardiology, plans on scheduling with dr arlette jauregui.  Colonoscopy/ifobt advised\par \par The patient is  with 2 children.  He works as a  in private practice.  He would have no difficulty walking 4 to 6 blocks or 2 flights of stairs.

## 2023-03-31 ENCOUNTER — APPOINTMENT (OUTPATIENT)
Dept: INTERNAL MEDICINE | Facility: CLINIC | Age: 63
End: 2023-03-31

## 2023-08-25 ENCOUNTER — APPOINTMENT (OUTPATIENT)
Dept: INTERNAL MEDICINE | Facility: CLINIC | Age: 63
End: 2023-08-25
Payer: COMMERCIAL

## 2023-08-25 VITALS
TEMPERATURE: 97.7 F | BODY MASS INDEX: 24.33 KG/M2 | OXYGEN SATURATION: 97 % | SYSTOLIC BLOOD PRESSURE: 127 MMHG | DIASTOLIC BLOOD PRESSURE: 75 MMHG | WEIGHT: 155 LBS | HEART RATE: 71 BPM | HEIGHT: 67 IN

## 2023-08-25 DIAGNOSIS — Z00.00 ENCOUNTER FOR GENERAL ADULT MEDICAL EXAMINATION W/OUT ABNORMAL FINDINGS: ICD-10-CM

## 2023-08-25 DIAGNOSIS — E78.5 HYPERLIPIDEMIA, UNSPECIFIED: ICD-10-CM

## 2023-08-25 DIAGNOSIS — I25.10 ATHEROSCLEROTIC HEART DISEASE OF NATIVE CORONARY ARTERY W/OUT ANGINA PECTORIS: ICD-10-CM

## 2023-08-25 DIAGNOSIS — Z23 ENCOUNTER FOR IMMUNIZATION: ICD-10-CM

## 2023-08-25 DIAGNOSIS — I10 ESSENTIAL (PRIMARY) HYPERTENSION: ICD-10-CM

## 2023-08-25 PROCEDURE — 99396 PREV VISIT EST AGE 40-64: CPT | Mod: 25

## 2023-08-25 PROCEDURE — 36415 COLL VENOUS BLD VENIPUNCTURE: CPT

## 2023-08-25 NOTE — PHYSICAL EXAM
[No Acute Distress] : no acute distress [Well Nourished] : well nourished [Well Developed] : well developed [Well-Appearing] : well-appearing [Normal Sclera/Conjunctiva] : normal sclera/conjunctiva [PERRL] : pupils equal round and reactive to light [EOMI] : extraocular movements intact [Normal Outer Ear/Nose] : the outer ears and nose were normal in appearance [Normal Oropharynx] : the oropharynx was normal [Normal TMs] : both tympanic membranes were normal [Normal Nasal Mucosa] : the nasal mucosa was normal [No Lymphadenopathy] : no lymphadenopathy [Supple] : supple [Thyroid Normal, No Nodules] : the thyroid was normal and there were no nodules present [No Respiratory Distress] : no respiratory distress  [No Accessory Muscle Use] : no accessory muscle use [Clear to Auscultation] : lungs were clear to auscultation bilaterally [Normal Rate] : normal rate  [Regular Rhythm] : with a regular rhythm [Normal S1, S2] : normal S1 and S2 [No Murmur] : no murmur heard [No Carotid Bruits] : no carotid bruits [No Edema] : there was no peripheral edema [Soft] : abdomen soft [Non Tender] : non-tender [Non-distended] : non-distended [No Masses] : no abdominal mass palpated [Normal Bowel Sounds] : normal bowel sounds [No CVA Tenderness] : no CVA  tenderness [Coordination Grossly Intact] : coordination grossly intact [No Focal Deficits] : no focal deficits [Normal Gait] : normal gait [Deep Tendon Reflexes (DTR)] : deep tendon reflexes were 2+ and symmetric [Speech Grossly Normal] : speech grossly normal [Memory Grossly Normal] : memory grossly normal [Normal Affect] : the affect was normal [Alert and Oriented x3] : oriented to person, place, and time [Normal Mood] : the mood was normal [Normal Insight/Judgement] : insight and judgment were intact

## 2023-08-25 NOTE — HISTORY OF PRESENT ILLNESS
[FreeTextEntry1] : Annual Physical [de-identified] : ARIS MAGDALENO is a 64 yo man with CAD s/p stent 2011, hypertension, hypercholesterolemia here for a physical.  He has been well overall.  Cardiology utd.  Colonoscopy/ifobt advised  The patient is  with 2 children.  He works as a  in private practice.  He would have no difficulty walking 4 to 6 blocks or 2 flights of stairs.

## 2023-08-25 NOTE — HEALTH RISK ASSESSMENT
[Good] : ~his/her~  mood as  good [No] : In the past 12 months have you used drugs other than those required for medical reasons? No [No falls in past year] : Patient reported no falls in the past year [0] : 2) Feeling down, depressed, or hopeless: Not at all (0) [None] : None [With Family] : lives with family [Employed] : employed [Feels Safe at Home] : Feels safe at home [Fully functional (bathing, dressing, toileting, transferring, walking, feeding)] : Fully functional (bathing, dressing, toileting, transferring, walking, feeding) [Fully functional (using the telephone, shopping, preparing meals, housekeeping, doing laundry, using] : Fully functional and needs no help or supervision to perform IADLs (using the telephone, shopping, preparing meals, housekeeping, doing laundry, using transportation, managing medications and managing finances) [Smoke Detector] : smoke detector [Carbon Monoxide Detector] : carbon monoxide detector [Seat Belt] :  uses seat belt [Never] : Never [Yes] : Yes [Monthly or less (1 pt)] : Monthly or less (1 point) [de-identified] : active [de-identified] : regular [[id number]] : 0 [Reports changes in hearing] : Reports no changes in hearing [Reports changes in vision] : Reports no changes in vision [Reports changes in dental health] : Reports no changes in dental health

## 2023-08-25 NOTE — ASSESSMENT
[FreeTextEntry1] : HCM Labs drawn in office today Advised colonoscopy or ifobt yearly continue current meds Tdap today BP check in 6 months Shingrix utd

## 2023-08-30 LAB
25(OH)D3 SERPL-MCNC: 31.9 NG/ML
ALBUMIN SERPL ELPH-MCNC: 5.1 G/DL
ALP BLD-CCNC: 71 U/L
ALT SERPL-CCNC: 40 U/L
ANION GAP SERPL CALC-SCNC: 17 MMOL/L
APPEARANCE: CLEAR
AST SERPL-CCNC: 26 U/L
BILIRUB SERPL-MCNC: 0.8 MG/DL
BILIRUBIN URINE: NEGATIVE
BLOOD URINE: NEGATIVE
BUN SERPL-MCNC: 17 MG/DL
CALCIUM SERPL-MCNC: 10.1 MG/DL
CHLORIDE SERPL-SCNC: 103 MMOL/L
CHOLEST SERPL-MCNC: 194 MG/DL
CO2 SERPL-SCNC: 25 MMOL/L
COLOR: YELLOW
CREAT SERPL-MCNC: 1.14 MG/DL
EGFR: 73 ML/MIN/1.73M2
ESTIMATED AVERAGE GLUCOSE: 97 MG/DL
GLUCOSE QUALITATIVE U: NEGATIVE MG/DL
GLUCOSE SERPL-MCNC: 113 MG/DL
HBA1C MFR BLD HPLC: 5 %
HDLC SERPL-MCNC: 47 MG/DL
KETONES URINE: NEGATIVE MG/DL
LDLC SERPL CALC-MCNC: 119 MG/DL
LEUKOCYTE ESTERASE URINE: NEGATIVE
NITRITE URINE: NEGATIVE
NONHDLC SERPL-MCNC: 146 MG/DL
PH URINE: 6.5
POTASSIUM SERPL-SCNC: 4.3 MMOL/L
PROT SERPL-MCNC: 7.2 G/DL
PROTEIN URINE: NEGATIVE MG/DL
PSA SERPL-MCNC: 1.24 NG/ML
SODIUM SERPL-SCNC: 145 MMOL/L
SPECIFIC GRAVITY URINE: 1.02
T4 FREE SERPL-MCNC: 1.4 NG/DL
TRIGL SERPL-MCNC: 157 MG/DL
TSH SERPL-ACNC: 1.34 UIU/ML
UROBILINOGEN URINE: 0.2 MG/DL
VIT B12 SERPL-MCNC: 325 PG/ML

## 2024-03-23 ENCOUNTER — INPATIENT (INPATIENT)
Facility: HOSPITAL | Age: 64
LOS: 3 days | Discharge: HOME | DRG: 322 | End: 2024-03-27
Attending: STUDENT IN AN ORGANIZED HEALTH CARE EDUCATION/TRAINING PROGRAM | Admitting: STUDENT IN AN ORGANIZED HEALTH CARE EDUCATION/TRAINING PROGRAM
Payer: COMMERCIAL

## 2024-03-23 VITALS
SYSTOLIC BLOOD PRESSURE: 196 MMHG | TEMPERATURE: 98 F | RESPIRATION RATE: 18 BRPM | OXYGEN SATURATION: 94 % | WEIGHT: 154.98 LBS | HEIGHT: 67 IN | HEART RATE: 75 BPM | DIASTOLIC BLOOD PRESSURE: 94 MMHG

## 2024-03-23 PROCEDURE — 99285 EMERGENCY DEPT VISIT HI MDM: CPT

## 2024-03-24 ENCOUNTER — RESULT REVIEW (OUTPATIENT)
Age: 64
End: 2024-03-24

## 2024-03-24 DIAGNOSIS — I10 ESSENTIAL (PRIMARY) HYPERTENSION: ICD-10-CM

## 2024-03-24 DIAGNOSIS — E78.5 HYPERLIPIDEMIA, UNSPECIFIED: ICD-10-CM

## 2024-03-24 DIAGNOSIS — I21.4 NON-ST ELEVATION (NSTEMI) MYOCARDIAL INFARCTION: ICD-10-CM

## 2024-03-24 DIAGNOSIS — R07.9 CHEST PAIN, UNSPECIFIED: ICD-10-CM

## 2024-03-24 LAB
ALBUMIN SERPL ELPH-MCNC: 4.6 G/DL — SIGNIFICANT CHANGE UP (ref 3.3–5)
ALP SERPL-CCNC: 87 U/L — SIGNIFICANT CHANGE UP (ref 40–120)
ALT FLD-CCNC: 59 U/L — HIGH (ref 10–45)
ANION GAP SERPL CALC-SCNC: 12 MMOL/L — SIGNIFICANT CHANGE UP (ref 5–17)
APTT BLD: 30.4 SEC — SIGNIFICANT CHANGE UP (ref 24.5–35.6)
APTT BLD: 49.7 SEC — HIGH (ref 24.5–35.6)
APTT BLD: >200 SEC — CRITICAL HIGH (ref 24.5–35.6)
AST SERPL-CCNC: 33 U/L — SIGNIFICANT CHANGE UP (ref 10–40)
BASOPHILS # BLD AUTO: 0.02 K/UL — SIGNIFICANT CHANGE UP (ref 0–0.2)
BASOPHILS NFR BLD AUTO: 0.2 % — SIGNIFICANT CHANGE UP (ref 0–2)
BILIRUB SERPL-MCNC: 0.4 MG/DL — SIGNIFICANT CHANGE UP (ref 0.2–1.2)
BUN SERPL-MCNC: 16 MG/DL — SIGNIFICANT CHANGE UP (ref 7–23)
CALCIUM SERPL-MCNC: 10 MG/DL — SIGNIFICANT CHANGE UP (ref 8.4–10.5)
CHLORIDE SERPL-SCNC: 103 MMOL/L — SIGNIFICANT CHANGE UP (ref 96–108)
CK MB BLD-MCNC: 5 % — HIGH (ref 0–3.5)
CK MB BLD-MCNC: 5.8 % — HIGH (ref 0–3.5)
CK MB BLD-MCNC: 6.5 % — HIGH (ref 0–3.5)
CK MB CFR SERPL CALC: 11.5 NG/ML — HIGH (ref 0–6.7)
CK MB CFR SERPL CALC: 6.7 NG/ML — SIGNIFICANT CHANGE UP (ref 0–6.7)
CK MB CFR SERPL CALC: 8.7 NG/ML — HIGH (ref 0–6.7)
CK SERPL-CCNC: 135 U/L — SIGNIFICANT CHANGE UP (ref 30–200)
CK SERPL-CCNC: 149 U/L — SIGNIFICANT CHANGE UP (ref 30–200)
CK SERPL-CCNC: 176 U/L — SIGNIFICANT CHANGE UP (ref 30–200)
CO2 SERPL-SCNC: 29 MMOL/L — SIGNIFICANT CHANGE UP (ref 22–31)
CREAT SERPL-MCNC: 1.21 MG/DL — SIGNIFICANT CHANGE UP (ref 0.5–1.3)
EGFR: 67 ML/MIN/1.73M2 — SIGNIFICANT CHANGE UP
EOSINOPHIL # BLD AUTO: 0.06 K/UL — SIGNIFICANT CHANGE UP (ref 0–0.5)
EOSINOPHIL NFR BLD AUTO: 0.7 % — SIGNIFICANT CHANGE UP (ref 0–6)
GLUCOSE SERPL-MCNC: 137 MG/DL — HIGH (ref 70–99)
HCT VFR BLD CALC: 39.6 % — SIGNIFICANT CHANGE UP (ref 39–50)
HCT VFR BLD CALC: 42.4 % — SIGNIFICANT CHANGE UP (ref 39–50)
HGB BLD-MCNC: 13.7 G/DL — SIGNIFICANT CHANGE UP (ref 13–17)
HGB BLD-MCNC: 13.8 G/DL — SIGNIFICANT CHANGE UP (ref 13–17)
IMM GRANULOCYTES NFR BLD AUTO: 0.4 % — SIGNIFICANT CHANGE UP (ref 0–0.9)
INR BLD: 0.89 RATIO — SIGNIFICANT CHANGE UP (ref 0.85–1.18)
LYMPHOCYTES # BLD AUTO: 1.18 K/UL — SIGNIFICANT CHANGE UP (ref 1–3.3)
LYMPHOCYTES # BLD AUTO: 12.8 % — LOW (ref 13–44)
MCHC RBC-ENTMCNC: 30 PG — SIGNIFICANT CHANGE UP (ref 27–34)
MCHC RBC-ENTMCNC: 30.9 PG — SIGNIFICANT CHANGE UP (ref 27–34)
MCHC RBC-ENTMCNC: 32.5 GM/DL — SIGNIFICANT CHANGE UP (ref 32–36)
MCHC RBC-ENTMCNC: 34.6 GM/DL — SIGNIFICANT CHANGE UP (ref 32–36)
MCV RBC AUTO: 89.4 FL — SIGNIFICANT CHANGE UP (ref 80–100)
MCV RBC AUTO: 92.2 FL — SIGNIFICANT CHANGE UP (ref 80–100)
MONOCYTES # BLD AUTO: 0.79 K/UL — SIGNIFICANT CHANGE UP (ref 0–0.9)
MONOCYTES NFR BLD AUTO: 8.6 % — SIGNIFICANT CHANGE UP (ref 2–14)
NEUTROPHILS # BLD AUTO: 7.13 K/UL — SIGNIFICANT CHANGE UP (ref 1.8–7.4)
NEUTROPHILS NFR BLD AUTO: 77.3 % — HIGH (ref 43–77)
NRBC # BLD: 0 /100 WBCS — SIGNIFICANT CHANGE UP (ref 0–0)
NRBC # BLD: 0 /100 WBCS — SIGNIFICANT CHANGE UP (ref 0–0)
NT-PROBNP SERPL-SCNC: 148 PG/ML — SIGNIFICANT CHANGE UP (ref 0–300)
PLATELET # BLD AUTO: 235 K/UL — SIGNIFICANT CHANGE UP (ref 150–400)
PLATELET # BLD AUTO: 263 K/UL — SIGNIFICANT CHANGE UP (ref 150–400)
POTASSIUM SERPL-MCNC: 3.7 MMOL/L — SIGNIFICANT CHANGE UP (ref 3.5–5.3)
POTASSIUM SERPL-SCNC: 3.7 MMOL/L — SIGNIFICANT CHANGE UP (ref 3.5–5.3)
PROT SERPL-MCNC: 7.2 G/DL — SIGNIFICANT CHANGE UP (ref 6–8.3)
PROTHROM AB SERPL-ACNC: 9.8 SEC — SIGNIFICANT CHANGE UP (ref 9.5–13)
RBC # BLD: 4.43 M/UL — SIGNIFICANT CHANGE UP (ref 4.2–5.8)
RBC # BLD: 4.6 M/UL — SIGNIFICANT CHANGE UP (ref 4.2–5.8)
RBC # FLD: 12 % — SIGNIFICANT CHANGE UP (ref 10.3–14.5)
RBC # FLD: 12.1 % — SIGNIFICANT CHANGE UP (ref 10.3–14.5)
SODIUM SERPL-SCNC: 144 MMOL/L — SIGNIFICANT CHANGE UP (ref 135–145)
TROPONIN T, HIGH SENSITIVITY RESULT: 128 NG/L — HIGH (ref 0–51)
TROPONIN T, HIGH SENSITIVITY RESULT: 147 NG/L — HIGH (ref 0–51)
TROPONIN T, HIGH SENSITIVITY RESULT: 165 NG/L — HIGH (ref 0–51)
TROPONIN T, HIGH SENSITIVITY RESULT: 251 NG/L — HIGH (ref 0–51)
WBC # BLD: 7.4 K/UL — SIGNIFICANT CHANGE UP (ref 3.8–10.5)
WBC # BLD: 9.22 K/UL — SIGNIFICANT CHANGE UP (ref 3.8–10.5)
WBC # FLD AUTO: 7.4 K/UL — SIGNIFICANT CHANGE UP (ref 3.8–10.5)
WBC # FLD AUTO: 9.22 K/UL — SIGNIFICANT CHANGE UP (ref 3.8–10.5)

## 2024-03-24 PROCEDURE — 71046 X-RAY EXAM CHEST 2 VIEWS: CPT | Mod: 26

## 2024-03-24 PROCEDURE — 93306 TTE W/DOPPLER COMPLETE: CPT | Mod: 26

## 2024-03-24 PROCEDURE — 99223 1ST HOSP IP/OBS HIGH 75: CPT

## 2024-03-24 RX ORDER — ONDANSETRON 8 MG/1
4 TABLET, FILM COATED ORAL EVERY 8 HOURS
Refills: 0 | Status: DISCONTINUED | OUTPATIENT
Start: 2024-03-24 | End: 2024-03-27

## 2024-03-24 RX ORDER — ACETAMINOPHEN 500 MG
1000 TABLET ORAL ONCE
Refills: 0 | Status: COMPLETED | OUTPATIENT
Start: 2024-03-24 | End: 2024-03-24

## 2024-03-24 RX ORDER — ATORVASTATIN CALCIUM 80 MG/1
80 TABLET, FILM COATED ORAL AT BEDTIME
Refills: 0 | Status: DISCONTINUED | OUTPATIENT
Start: 2024-03-24 | End: 2024-03-27

## 2024-03-24 RX ORDER — LOSARTAN POTASSIUM 100 MG/1
50 TABLET, FILM COATED ORAL DAILY
Refills: 0 | Status: DISCONTINUED | OUTPATIENT
Start: 2024-03-24 | End: 2024-03-27

## 2024-03-24 RX ORDER — TICAGRELOR 90 MG/1
180 TABLET ORAL ONCE
Refills: 0 | Status: COMPLETED | OUTPATIENT
Start: 2024-03-24 | End: 2024-03-24

## 2024-03-24 RX ORDER — NIACIN 50 MG
1 TABLET ORAL
Refills: 0 | DISCHARGE

## 2024-03-24 RX ORDER — TICAGRELOR 90 MG/1
90 TABLET ORAL EVERY 12 HOURS
Refills: 0 | Status: DISCONTINUED | OUTPATIENT
Start: 2024-03-24 | End: 2024-03-27

## 2024-03-24 RX ORDER — ACETAMINOPHEN 500 MG
650 TABLET ORAL EVERY 6 HOURS
Refills: 0 | Status: DISCONTINUED | OUTPATIENT
Start: 2024-03-24 | End: 2024-03-27

## 2024-03-24 RX ORDER — LANOLIN ALCOHOL/MO/W.PET/CERES
3 CREAM (GRAM) TOPICAL AT BEDTIME
Refills: 0 | Status: DISCONTINUED | OUTPATIENT
Start: 2024-03-24 | End: 2024-03-27

## 2024-03-24 RX ORDER — HEPARIN SODIUM 5000 [USP'U]/ML
4400 INJECTION INTRAVENOUS; SUBCUTANEOUS EVERY 6 HOURS
Refills: 0 | Status: DISCONTINUED | OUTPATIENT
Start: 2024-03-24 | End: 2024-03-25

## 2024-03-24 RX ORDER — ATORVASTATIN CALCIUM 80 MG/1
40 TABLET, FILM COATED ORAL AT BEDTIME
Refills: 0 | Status: DISCONTINUED | OUTPATIENT
Start: 2024-03-24 | End: 2024-03-24

## 2024-03-24 RX ORDER — ASPIRIN/CALCIUM CARB/MAGNESIUM 324 MG
81 TABLET ORAL DAILY
Refills: 0 | Status: DISCONTINUED | OUTPATIENT
Start: 2024-03-24 | End: 2024-03-27

## 2024-03-24 RX ORDER — HEPARIN SODIUM 5000 [USP'U]/ML
4400 INJECTION INTRAVENOUS; SUBCUTANEOUS ONCE
Refills: 0 | Status: COMPLETED | OUTPATIENT
Start: 2024-03-24 | End: 2024-03-24

## 2024-03-24 RX ORDER — HEPARIN SODIUM 5000 [USP'U]/ML
INJECTION INTRAVENOUS; SUBCUTANEOUS
Qty: 25000 | Refills: 0 | Status: DISCONTINUED | OUTPATIENT
Start: 2024-03-24 | End: 2024-03-25

## 2024-03-24 RX ORDER — IRBESARTAN AND HYDROCHLOROTHIAZIDE 12.5; 3 MG/1; MG/1
1 TABLET ORAL
Refills: 0 | DISCHARGE

## 2024-03-24 RX ORDER — NIACIN 50 MG
500 TABLET ORAL AT BEDTIME
Refills: 0 | Status: DISCONTINUED | OUTPATIENT
Start: 2024-03-24 | End: 2024-03-27

## 2024-03-24 RX ORDER — FLUTICASONE PROPIONATE 50 MCG
1 SPRAY, SUSPENSION NASAL
Refills: 0 | Status: DISCONTINUED | OUTPATIENT
Start: 2024-03-24 | End: 2024-03-27

## 2024-03-24 RX ORDER — SIMVASTATIN 20 MG/1
1 TABLET, FILM COATED ORAL
Refills: 0 | DISCHARGE

## 2024-03-24 RX ADMIN — ATORVASTATIN CALCIUM 80 MILLIGRAM(S): 80 TABLET, FILM COATED ORAL at 21:26

## 2024-03-24 RX ADMIN — TICAGRELOR 90 MILLIGRAM(S): 90 TABLET ORAL at 17:22

## 2024-03-24 RX ADMIN — HEPARIN SODIUM 900 UNIT(S)/HR: 5000 INJECTION INTRAVENOUS; SUBCUTANEOUS at 03:18

## 2024-03-24 RX ADMIN — HEPARIN SODIUM 700 UNIT(S)/HR: 5000 INJECTION INTRAVENOUS; SUBCUTANEOUS at 11:50

## 2024-03-24 RX ADMIN — TICAGRELOR 180 MILLIGRAM(S): 90 TABLET ORAL at 03:25

## 2024-03-24 RX ADMIN — Medication 500 MILLIGRAM(S): at 21:26

## 2024-03-24 RX ADMIN — HEPARIN SODIUM 900 UNIT(S)/HR: 5000 INJECTION INTRAVENOUS; SUBCUTANEOUS at 07:39

## 2024-03-24 RX ADMIN — LOSARTAN POTASSIUM 50 MILLIGRAM(S): 100 TABLET, FILM COATED ORAL at 11:42

## 2024-03-24 RX ADMIN — HEPARIN SODIUM 4400 UNIT(S): 5000 INJECTION INTRAVENOUS; SUBCUTANEOUS at 03:17

## 2024-03-24 RX ADMIN — HEPARIN SODIUM 0 UNIT(S)/HR: 5000 INJECTION INTRAVENOUS; SUBCUTANEOUS at 10:47

## 2024-03-24 RX ADMIN — Medication 81 MILLIGRAM(S): at 11:42

## 2024-03-24 RX ADMIN — Medication 1 SPRAY(S): at 17:25

## 2024-03-24 RX ADMIN — HEPARIN SODIUM 850 UNIT(S)/HR: 5000 INJECTION INTRAVENOUS; SUBCUTANEOUS at 18:28

## 2024-03-24 NOTE — ED PROVIDER NOTE - ATTENDING CONTRIBUTION TO CARE
I, Dr. Alexandria Cool, have personally performed a face to face medical and diagnostic evaluation of the patient. I have discussed with and reviewed the Resident's and/or ACP's and/or Medical/PA/NP student's note and agree with the History, ROS, Physical Exam and MDM unless otherwise indicated. A brief summary of my personal evaluation and impression can be found below.     HPI: 65 y/o male w/ PMH HTN, HLD, CAD w/ 2 stents 12 years ago c/o 1 week history of intermittent left-sided chest pain w/ radiation to the neck, saw cardiologist, scheduled for stress test this Monday. Pt then had 2 day history of increasing left-sided chest pain that worsened today w/ radiation to the neck, prompting ED arrival. Pt's pain has currently improved, taken 405mg ASA prior to ED arrival. Pt is otherwise asymptomatic. Denies fevers, chills, nausea, vomiting, dizziness, SOB, abdominal pain, dysuria, hematuria.     Cardiologist: Marquez Haas    PE: Well appearing, clear lungs, normal heart sounds, peripheral pulses intact and symmetric bilaterally, abdomen soft/nontender.     MDM: Patient with cad with 2 stents presenting with extertional chest discomfort for several days. EKG here with RBBB which is new but otherwise ST segments are unchanged. Concern for cardiac etiology of pain. No active chest pain. less likely MSK vs PNA. No PE risk factors, not hypoxic or tachycardic. will obtain labs, cxr, and anticipate admission vs cdu for further cardiac workup.

## 2024-03-24 NOTE — PATIENT PROFILE ADULT - FALL HARM RISK - HARM RISK INTERVENTIONS

## 2024-03-24 NOTE — ED PROVIDER NOTE - OBJECTIVE STATEMENT
65 y/o male w/ PMH HTN, HLD, CAD w/ 2 stents 12 years ago c/o 1 week history of intermittent left-sided chest pain w/ radiation to the neck, saw cardiologist, scheduled for stress test this Monday. Pt then had 2 day history of increasing left-sided chest pain that worsened today w/ radiation to the neck, prompting ED arrival. Pt's pain has currently improved, taken 405mg ASA prior to ED arrival. Pt is otherwise asymptomatic. Denies fevers, chills, nausea, vomiting, dizziness, SOB, abdominal pain, dysuria, hematuria.     Cardiologist: Marquez Haas

## 2024-03-24 NOTE — H&P ADULT - BIRTH SEX
----- Message from Phoebe Chanel sent at 11/30/2018  8:15 AM CST -----  Contact: [phone]  Patient would like to get test results    Name of test (lab, mammo, etc.):   Labs    Date of test:  11/16/18    Ordering provider: Reed    Where was the test performed:  [facility]    Would you prefer a response via Blurbt?:  No    Comments:     Male

## 2024-03-24 NOTE — ED ADULT NURSE NOTE - OBJECTIVE STATEMENT
63 y/o male presents to the ED endorsing intermittent L-sided chest pain radiating to the neck x1 week. A/Ox4. Ambulatory without assistive devices at baseline. PMH: HTN, HLD, CAD w/ 2 stents 12 years ago. Patient initially notified cardiologist regarding the presence of chest pain and scheduled a nuclear stress test for Monday. Patient endorses chest pain returned over the past 2 days thus prompting the visit to the ED. Patient denies any trauma or heavy lifting. Patient denies fever, cough, chills, nausea, vomiting, dyspnea and diarrhea. Patient on CM, NSR. Safety and comfort provided.

## 2024-03-24 NOTE — H&P ADULT - HISTORY OF PRESENT ILLNESS
65 y/o male w/ PMH HTN, HLD, CAD w/ 2 stents 12 years ago c/o 1 week history of intermittent left-sided chest pain w/ radiation to the neck, saw cardiologist, scheduled for stress test this Monday. Pt then had 2 day history of increasing left-sided chest pain that worsened today w/ radiation to the neck, prompting ED arrival. PAin was relieved by resting. Pt's pain has currently improved, taken 405mg ASA prior to ED arrival. . Denies fevers, chills, nausea, vomiting, dizziness, SOB, abdominal pain, dysuria,

## 2024-03-24 NOTE — ED PROVIDER NOTE - PROGRESS NOTE DETAILS
Kristian BUCHANAN: Asim Vance cardiology consulted for high-risk chest pain. Kristian BUCHANAN: Uptrending troponin, heparin and brilinta initiated, will order another set of cardiac enzymes and admit on tele. Kristian BUCHANAN: Unable to notify Rajiv in regards to uptrending troponin. In-house cardiology fellow Adarsh BUCHANAN notified.

## 2024-03-24 NOTE — H&P ADULT - NSHPPHYSICALEXAM_GEN_ALL_CORE
uto
Vital Signs Last 24 Hrs  T(C): 36.9 (24 Mar 2024 12:24), Max: 36.9 (24 Mar 2024 12:24)  T(F): 98.5 (24 Mar 2024 12:24), Max: 98.5 (24 Mar 2024 12:24)  HR: 80 (24 Mar 2024 12:24) (55 - 80)  BP: 164/72 (24 Mar 2024 12:24) (164/72 - 196/94)  BP(mean): --  RR: 18 (24 Mar 2024 12:24) (18 - 18)  SpO2: 97% (24 Mar 2024 12:24) (94% - 98%)    Parameters below as of 24 Mar 2024 12:24  Patient On (Oxygen Delivery Method): room air        CONSTITUTIONAL: NAD, well-developed, well-groomed  EYES: PERRL; conjunctiva and sclera clear  ENMT: Moist oral mucosa, no pharyngeal injection or exudates; normal dentition  NECK: Supple, no palpable masses;  RESPIRATORY: Normal respiratory effort; lungs are clear to auscultation bilaterally  CARDIOVASCULAR: Regular rate and rhythm, normal S1 and S2, no murmur/rub/gallop; No lower extremity edema; Peripheral pulses are 2+ bilaterally  ABDOMEN: Nontender to palpation, normoactive bowel sounds, no rebound/guarding; No hepatosplenomegaly  MUSCULOSKELETAL:  ; no clubbing or cyanosis of digits; no joint swelling or tenderness to palpation  PSYCH: A+O to person, place, and time; affect appropriate  NEUROLOGY: CN 2-12 are intact and symmetric; no gross sensory deficits   SKIN: No rashes; no palpable lesions

## 2024-03-24 NOTE — ED ADULT NURSE NOTE - NSFALLUNIVINTERV_ED_ALL_ED
Bed/Stretcher in lowest position, wheels locked, appropriate side rails in place/Call bell, personal items and telephone in reach/Instruct patient to call for assistance before getting out of bed/chair/stretcher/Non-slip footwear applied when patient is off stretcher/[place] to call system/Physically safe environment - no spills, clutter or unnecessary equipment/Purposeful proactive rounding/Room/bathroom lighting operational, light cord in reach

## 2024-03-24 NOTE — CONSULT NOTE ADULT - SUBJECTIVE AND OBJECTIVE BOX
DATE OF SERVICE: 03-24-24 @ 11:39    CHIEF COMPLAINT:Patient is a 64y old  Male who presents with a chief complaint of     HISTORY OF PRESENT ILLNESS:HPI:      PAST MEDICAL & SURGICAL HISTORY:          MEDICATIONS:  aspirin  chewable 81 milliGRAM(s) Oral daily  heparin   Injectable 4400 Unit(s) IV Push every 6 hours PRN  heparin  Infusion.  Unit(s)/Hr IV Continuous <Continuous>  losartan 50 milliGRAM(s) Oral daily  ticagrelor 90 milliGRAM(s) Oral every 12 hours        acetaminophen     Tablet .. 650 milliGRAM(s) Oral every 6 hours PRN  melatonin 3 milliGRAM(s) Oral at bedtime PRN  ondansetron Injectable 4 milliGRAM(s) IV Push every 8 hours PRN    aluminum hydroxide/magnesium hydroxide/simethicone Suspension 30 milliLiter(s) Oral every 4 hours PRN    atorvastatin 40 milliGRAM(s) Oral at bedtime  niacin  milliGRAM(s) Oral at bedtime        FAMILY HISTORY:      Non-contributory    SOCIAL HISTORY:    [ ] Tobacco  [ ] Drugs  [ ] Alcohol    Allergies    No Known Allergies    Intolerances    	    REVIEW OF SYSTEMS:  CONSTITUTIONAL: No fever  EYES: No eye pain, visual disturbances, or discharge  ENMT:  No difficulty hearing, tinnitus  NECK: No pain or stiffness  RESPIRATORY: No cough, wheezing,  CARDIOVASCULAR: No chest pain, palpitations, passing out, dizziness, or leg swelling  GASTROINTESTINAL:  No nausea, vomiting, diarrhea or constipation. No melena.  GENITOURINARY: No dysuria, hematuria  NEUROLOGICAL: No stroke like symptoms  SKIN: No burning or lesions   ENDOCRINE: No heat or cold intolerance  MUSCULOSKELETAL: No joint pain or swelling  PSYCHIATRIC: No  anxiety, mood swings  HEME/LYMPH: No bleeding gums  ALLERGY AND IMMUNOLOGIC: No hives or eczema	    All other ROS negative    PHYSICAL EXAM:  T(C): 36.8 (03-24-24 @ 04:10), Max: 36.8 (03-24-24 @ 02:11)  HR: 55 (03-24-24 @ 04:10) (55 - 78)  BP: 164/92 (03-24-24 @ 11:17) (164/92 - 196/94)  RR: 18 (03-24-24 @ 04:10) (18 - 18)  SpO2: 95% (03-24-24 @ 04:10) (94% - 98%)  Wt(kg): --  I&O's Summary      Appearance: Normal	  HEENT:   Normal oral mucosa, EOMI	  Cardiovascular:  S1 S2, No JVD,    Respiratory: Lungs clear to auscultation	  Psychiatry: Alert  Gastrointestinal:  Soft, Non-tender, + BS	  Skin: No rashes   Neurologic: Non-focal  Extremities:  No edema  Vascular: Peripheral pulses palpable    	    	  	  CARDIAC MARKERS:  Labs personally reviewed by me                                  13.7   7.40  )-----------( 235      ( 24 Mar 2024 09:43 )             39.6     03-24    144  |  103  |  16  ----------------------------<  137<H>  3.7   |  29  |  1.21    Ca    10.0      24 Mar 2024 01:03    TPro  7.2  /  Alb  4.6  /  TBili  0.4  /  DBili  x   /  AST  33  /  ALT  59<H>  /  AlkPhos  87  03-24          EKG: Personally reviewed by me -   Radiology: Personally reviewed by me -       Assessment /Plan:     1. NSTEMI - hep gtt, [place] loaded, ASA  - TTE  - r/b benefits discussed, agrees to cath Monday AM  - CHest pain free and comfortable  - if recurrent CP repeat EKG and will consider cath earlier           Differential diagnosis and plan of care discussed with patient after the evaluation. Counseling on diet, nutritional counseling, weight management, exercise and medication compliance was done.   Advanced care planning/advanced directives discussed with patient/family. DNR status including forceful chest compressions to attempt to restart the heart, ventilator support/artificial breathing, electric shock, artificial nutrition, health care proxy, Molst form all discussed with pt. Pt wishes to consider. Sixteen minutes spent on discussing advanced directives.          Asim Vance DO [facility]  Cardiovascular Medicine  [address], [address]  Office [phone]  Available via call/text on Microsoft Teams DATE OF SERVICE: 03-24-24      CHIEF COMPLAINT:Patient is a 64y old  Male who presents with a chief complaint of chest pain (24 Mar 2024 12:38)      HISTORY OF PRESENT ILLNESS:HPI:  63 y/o male w/ PMH HTN, HLD, CAD w/ 2 stents 12 years ago c/o 1 week history of intermittent left-sided chest pain w/ radiation to the neck, saw cardiologist, scheduled for stress test this Monday. Pt then had 2 day history of increasing left-sided chest pain that worsened today w/ radiation to the neck, prompting ED arrival. PAin was relieved by resting. Pt's pain has currently improved, taken 405mg ASA prior to ED arrival. . Denies fevers, chills, nausea, vomiting, dizziness, SOB, abdominal pain, dysuria,  (24 Mar 2024 12:38)      PAST MEDICAL & SURGICAL HISTORY:  Hypertension      Hyperlipidemia      CAD (coronary artery disease)      No significant past surgical history          MEDICATIONS:  aspirin  chewable 81 milliGRAM(s) Oral daily  heparin   Injectable 4400 Unit(s) IV Push every 6 hours PRN  heparin  Infusion.  Unit(s)/Hr IV Continuous <Continuous>  losartan 50 milliGRAM(s) Oral daily  ticagrelor 90 milliGRAM(s) Oral every 12 hours        acetaminophen     Tablet .. 650 milliGRAM(s) Oral every 6 hours PRN  melatonin 3 milliGRAM(s) Oral at bedtime PRN  ondansetron Injectable 4 milliGRAM(s) IV Push every 8 hours PRN    aluminum hydroxide/magnesium hydroxide/simethicone Suspension 30 milliLiter(s) Oral every 4 hours PRN    atorvastatin 80 milliGRAM(s) Oral at bedtime  niacin  milliGRAM(s) Oral at bedtime    fluticasone propionate 50 MICROgram(s)/spray Nasal Spray 1 Spray(s) Both Nostrils two times a day      FAMILY HISTORY:  No pertinent family history in first degree relatives        Non-contributory    SOCIAL HISTORY:    [ ] not a smoker    Allergies    No Known Allergies    Intolerances    	    REVIEW OF SYSTEMS:  CONSTITUTIONAL: No fever  EYES: No eye pain, visual disturbances, or discharge  ENMT:  No difficulty hearing, tinnitus  NECK: No pain or stiffness  RESPIRATORY: No cough, wheezing,  CARDIOVASCULAR: + chest pain, no palpitations, passing out, dizziness, or leg swelling  GASTROINTESTINAL:  No nausea, vomiting, diarrhea or constipation. No melena.  GENITOURINARY: No dysuria, hematuria  NEUROLOGICAL: No stroke like symptoms  SKIN: No burning or lesions   ENDOCRINE: No heat or cold intolerance  MUSCULOSKELETAL: No joint pain or swelling  PSYCHIATRIC: No  anxiety, mood swings  HEME/LYMPH: No bleeding gums  ALLERGY AND IMMUNOLOGIC: No hives or eczema	    All other ROS negative    PHYSICAL EXAM:  T(C): 36.7 (03-24-24 @ 21:41), Max: 36.9 (03-24-24 @ 12:24)  HR: 72 (03-24-24 @ 21:41) (55 - 80)  BP: 136/72 (03-24-24 @ 21:41) (136/72 - 190/88)  RR: 18 (03-24-24 @ 21:41) (18 - 18)  SpO2: 97% (03-24-24 @ 21:41) (95% - 98%)  Wt(kg): --  I&O's Summary    24 Mar 2024 07:01  -  24 Mar 2024 22:57  --------------------------------------------------------  IN: 250 mL / OUT: 0 mL / NET: 250 mL        Appearance: Normal	  HEENT:   Normal oral mucosa, EOMI	  Cardiovascular:  S1 S2, No JVD,    Respiratory: Lungs clear to auscultation	  Psychiatry: Alert  Gastrointestinal:  Soft, Non-tender, + BS	  Skin: No rashes   Neurologic: Non-focal  Extremities:  No edema  Vascular: Peripheral pulses palpable    	    	  	  CARDIAC MARKERS:  Labs personally reviewed by me                                  13.7   7.40  )-----------( 235      ( 24 Mar 2024 09:43 )             39.6     03-24    144  |  103  |  16  ----------------------------<  137<H>  3.7   |  29  |  1.21    Ca    10.0      24 Mar 2024 01:03    TPro  7.2  /  Alb  4.6  /  TBili  0.4  /  DBili  x   /  AST  33  /  ALT  59<H>  /  AlkPhos  87  03-24          EKG: Personally reviewed by me - NSR RBBB, inferolateral wall ischemia   Radiology: Personally reviewed by me - CXR clear lungs      Assessment/Plan:  65 yo M with HTN, HLD, p/w chest pain ruled in for NSTEMI    Problem/Plan - 1:  ·  Problem: NSTEMI (non-ST elevation myocardial infarction).   ·  Plan: aspirin, s/p brilinta load, on brilinta 90mg q12hr  - TTE  - trend troponins to peak  - Plan for [facility] Monday, if develops cp sooner then will cath earlier  - tele monitoring.  - Discussed with OP cards    Problem/Plan - 2:  ·  Problem: Hypertension.   ·  Plan: on irbesartan 150mg at home, here equiv is losartan 50mg, hold thiazide.    Problem/Plan - 3:  ·  Problem: Hyperlipidemia.   ·  Plan: change to high intensity statin 80mg   c/w niacin.        Differential diagnosis and plan of care discussed with patient after the evaluation. Counseling on diet, nutritional counseling, weight management, exercise and medication compliance was done.   Advanced care planning/advanced directives discussed with patient/family. DNR status including forceful chest compressions to attempt to restart the heart, ventilator support/artificial breathing, electric shock, artificial nutrition, health care proxy, Molst form all discussed with pt. Pt wishes to consider. Sixteen minutes spent on discussing advanced directives.          Asim Vance DO [facility]  Cardiovascular Medicine  [address] [place] [address], [address]  Office [phone]  Available via call/text on Microsoft Teams

## 2024-03-24 NOTE — H&P ADULT - PROBLEM SELECTOR PLAN 1
aspirin, s/p brilinta load, on brilinta 90mg q12hr  TTE  trend toponins to peak  d/w cards Dr Vance [facility] Monday if develops cp sooner then will cath sooner  tele monitoring

## 2024-03-24 NOTE — PATIENT PROFILE ADULT - FUNCTIONAL ASSESSMENT - BASIC MOBILITY 1.
27 weeks 6 day multip.  Good fetal movement.  She denies leaking of fluid, bleeding or contractions.  Kick counts and  labor instructions were reviewed.  GCT, CBC, HIV and RPR today.  Her blood type is O+.  She refused the Tdap shot.  Risks of pertussis (whooping cough) to babies was reviewed and the benefit of Tdap shots.  She still prefers to not have the Tdap shot.  Follow up in 2 weeks.  EPDS score is 8.   4 = No assist / stand by assistance

## 2024-03-24 NOTE — ED ADULT NURSE NOTE - CHIEF COMPLAINT QUOTE
chest tightness last week; saw cardiologist; scheduled for stress test on Monday; chest tightness returned an hour ago; radiating to neck.

## 2024-03-25 ENCOUNTER — TRANSCRIPTION ENCOUNTER (OUTPATIENT)
Age: 64
End: 2024-03-25

## 2024-03-25 LAB
ANION GAP SERPL CALC-SCNC: 15 MMOL/L — SIGNIFICANT CHANGE UP (ref 5–17)
APTT BLD: 62.3 SEC — HIGH (ref 24.5–35.6)
APTT BLD: 69.2 SEC — HIGH (ref 24.5–35.6)
BUN SERPL-MCNC: 14 MG/DL — SIGNIFICANT CHANGE UP (ref 7–23)
CALCIUM SERPL-MCNC: 9.5 MG/DL — SIGNIFICANT CHANGE UP (ref 8.4–10.5)
CHLORIDE SERPL-SCNC: 104 MMOL/L — SIGNIFICANT CHANGE UP (ref 96–108)
CK MB BLD-MCNC: 3.5 % — SIGNIFICANT CHANGE UP (ref 0–3.5)
CK MB BLD-MCNC: 3.8 % — HIGH (ref 0–3.5)
CK MB CFR SERPL CALC: 4.2 NG/ML — SIGNIFICANT CHANGE UP (ref 0–6.7)
CK MB CFR SERPL CALC: 5.2 NG/ML — SIGNIFICANT CHANGE UP (ref 0–6.7)
CK SERPL-CCNC: 121 U/L — SIGNIFICANT CHANGE UP (ref 30–200)
CK SERPL-CCNC: 138 U/L — SIGNIFICANT CHANGE UP (ref 30–200)
CO2 SERPL-SCNC: 24 MMOL/L — SIGNIFICANT CHANGE UP (ref 22–31)
CREAT SERPL-MCNC: 1.34 MG/DL — HIGH (ref 0.5–1.3)
EGFR: 60 ML/MIN/1.73M2 — SIGNIFICANT CHANGE UP
GLUCOSE SERPL-MCNC: 93 MG/DL — SIGNIFICANT CHANGE UP (ref 70–99)
HCT VFR BLD CALC: 40.5 % — SIGNIFICANT CHANGE UP (ref 39–50)
HCV AB S/CO SERPL IA: 0.07 S/CO — SIGNIFICANT CHANGE UP (ref 0–0.99)
HCV AB SERPL-IMP: SIGNIFICANT CHANGE UP
HGB BLD-MCNC: 13.6 G/DL — SIGNIFICANT CHANGE UP (ref 13–17)
MAGNESIUM SERPL-MCNC: 2.2 MG/DL — SIGNIFICANT CHANGE UP (ref 1.6–2.6)
MCHC RBC-ENTMCNC: 30.6 PG — SIGNIFICANT CHANGE UP (ref 27–34)
MCHC RBC-ENTMCNC: 33.6 GM/DL — SIGNIFICANT CHANGE UP (ref 32–36)
MCV RBC AUTO: 91.2 FL — SIGNIFICANT CHANGE UP (ref 80–100)
NRBC # BLD: 0 /100 WBCS — SIGNIFICANT CHANGE UP (ref 0–0)
PHOSPHATE SERPL-MCNC: 2.2 MG/DL — LOW (ref 2.5–4.5)
PLATELET # BLD AUTO: 245 K/UL — SIGNIFICANT CHANGE UP (ref 150–400)
POTASSIUM SERPL-MCNC: 3.9 MMOL/L — SIGNIFICANT CHANGE UP (ref 3.5–5.3)
POTASSIUM SERPL-SCNC: 3.9 MMOL/L — SIGNIFICANT CHANGE UP (ref 3.5–5.3)
RBC # BLD: 4.44 M/UL — SIGNIFICANT CHANGE UP (ref 4.2–5.8)
RBC # FLD: 12.2 % — SIGNIFICANT CHANGE UP (ref 10.3–14.5)
SODIUM SERPL-SCNC: 143 MMOL/L — SIGNIFICANT CHANGE UP (ref 135–145)
TROPONIN T, HIGH SENSITIVITY RESULT: 271 NG/L — HIGH (ref 0–51)
TROPONIN T, HIGH SENSITIVITY RESULT: 272 NG/L — HIGH (ref 0–51)
WBC # BLD: 6.27 K/UL — SIGNIFICANT CHANGE UP (ref 3.8–10.5)
WBC # FLD AUTO: 6.27 K/UL — SIGNIFICANT CHANGE UP (ref 3.8–10.5)

## 2024-03-25 PROCEDURE — 99233 SBSQ HOSP IP/OBS HIGH 50: CPT

## 2024-03-25 PROCEDURE — 92928 PRQ TCAT PLMT NTRAC ST 1 LES: CPT | Mod: LC

## 2024-03-25 PROCEDURE — 93454 CORONARY ARTERY ANGIO S&I: CPT | Mod: 26,59

## 2024-03-25 PROCEDURE — 99152 MOD SED SAME PHYS/QHP 5/>YRS: CPT

## 2024-03-25 RX ORDER — SODIUM CHLORIDE 9 MG/ML
1000 INJECTION INTRAMUSCULAR; INTRAVENOUS; SUBCUTANEOUS
Refills: 0 | Status: COMPLETED | OUTPATIENT
Start: 2024-03-25 | End: 2024-03-25

## 2024-03-25 RX ORDER — POTASSIUM PHOSPHATE, MONOBASIC POTASSIUM PHOSPHATE, DIBASIC 236; 224 MG/ML; MG/ML
15 INJECTION, SOLUTION INTRAVENOUS ONCE
Refills: 0 | Status: COMPLETED | OUTPATIENT
Start: 2024-03-25 | End: 2024-03-25

## 2024-03-25 RX ORDER — TICAGRELOR 90 MG/1
1 TABLET ORAL
Qty: 60 | Refills: 0
Start: 2024-03-25 | End: 2024-04-23

## 2024-03-25 RX ORDER — SODIUM CHLORIDE 9 MG/ML
250 INJECTION INTRAMUSCULAR; INTRAVENOUS; SUBCUTANEOUS ONCE
Refills: 0 | Status: COMPLETED | OUTPATIENT
Start: 2024-03-25 | End: 2024-03-25

## 2024-03-25 RX ADMIN — TICAGRELOR 90 MILLIGRAM(S): 90 TABLET ORAL at 05:09

## 2024-03-25 RX ADMIN — Medication 81 MILLIGRAM(S): at 10:09

## 2024-03-25 RX ADMIN — HEPARIN SODIUM 850 UNIT(S)/HR: 5000 INJECTION INTRAVENOUS; SUBCUTANEOUS at 09:04

## 2024-03-25 RX ADMIN — HEPARIN SODIUM 850 UNIT(S)/HR: 5000 INJECTION INTRAVENOUS; SUBCUTANEOUS at 01:29

## 2024-03-25 RX ADMIN — SODIUM CHLORIDE 100 MILLILITER(S): 9 INJECTION INTRAMUSCULAR; INTRAVENOUS; SUBCUTANEOUS at 13:47

## 2024-03-25 RX ADMIN — LOSARTAN POTASSIUM 50 MILLIGRAM(S): 100 TABLET, FILM COATED ORAL at 05:09

## 2024-03-25 RX ADMIN — ATORVASTATIN CALCIUM 80 MILLIGRAM(S): 80 TABLET, FILM COATED ORAL at 21:14

## 2024-03-25 RX ADMIN — TICAGRELOR 90 MILLIGRAM(S): 90 TABLET ORAL at 17:03

## 2024-03-25 RX ADMIN — Medication 1 SPRAY(S): at 05:09

## 2024-03-25 RX ADMIN — Medication 500 MILLIGRAM(S): at 21:14

## 2024-03-25 RX ADMIN — HEPARIN SODIUM 850 UNIT(S)/HR: 5000 INJECTION INTRAVENOUS; SUBCUTANEOUS at 10:00

## 2024-03-25 RX ADMIN — SODIUM CHLORIDE 75 MILLILITER(S): 9 INJECTION INTRAMUSCULAR; INTRAVENOUS; SUBCUTANEOUS at 10:00

## 2024-03-25 RX ADMIN — SODIUM CHLORIDE 750 MILLILITER(S): 9 INJECTION INTRAMUSCULAR; INTRAVENOUS; SUBCUTANEOUS at 10:00

## 2024-03-25 RX ADMIN — POTASSIUM PHOSPHATE, MONOBASIC POTASSIUM PHOSPHATE, DIBASIC 62.5 MILLIMOLE(S): 236; 224 INJECTION, SOLUTION INTRAVENOUS at 12:28

## 2024-03-25 RX ADMIN — Medication 1 SPRAY(S): at 17:03

## 2024-03-25 NOTE — PROGRESS NOTE ADULT - SUBJECTIVE AND OBJECTIVE BOX
Ariana Tran MD  Division of Hospital Medicine  Please contact via MS Teams (prefer message first)  Office: [phone]    Patient is a 64y old  Male who presents with a chief complaint of chest pain (25 Mar 2024 10:13)      SUBJECTIVE / OVERNIGHT EVENTS:  no acute events overnight, vss, afebrile  pt reports chest tightness this morning when ambulated to bathroom  otherwise, no other complaints  pending [facility] this morning    ROS:  14 point ROS negative in detail except stated as above    MEDICATIONS  (STANDING):  aspirin  chewable 81 milliGRAM(s) Oral daily  atorvastatin 80 milliGRAM(s) Oral at bedtime  fluticasone propionate 50 MICROgram(s)/spray Nasal Spray 1 Spray(s) Both Nostrils two times a day  heparin  Infusion.  Unit(s)/Hr (9 mL/Hr) IV Continuous <Continuous>  losartan 50 milliGRAM(s) Oral daily  niacin  milliGRAM(s) Oral at bedtime  potassium phosphate IVPB 15 milliMole(s) IV Intermittent once  sodium chloride 0.9%. 1000 milliLiter(s) (75 mL/Hr) IV Continuous <Continuous>  ticagrelor 90 milliGRAM(s) Oral every 12 hours    MEDICATIONS  (PRN):  acetaminophen     Tablet .. 650 milliGRAM(s) Oral every 6 hours PRN Temp greater or equal to 38C (100.4F), Mild Pain (1 - 3)  aluminum hydroxide/magnesium hydroxide/simethicone Suspension 30 milliLiter(s) Oral every 4 hours PRN Dyspepsia  heparin   Injectable 4400 Unit(s) IV Push every 6 hours PRN For aPTT less than 40  melatonin 3 milliGRAM(s) Oral at bedtime PRN Insomnia  ondansetron Injectable 4 milliGRAM(s) IV Push every 8 hours PRN Nausea and/or Vomiting      CAPILLARY BLOOD GLUCOSE        I&O's Summary    24 Mar 2024 07:01  -  25 Mar 2024 07:00  --------------------------------------------------------  IN: 250 mL / OUT: 0 mL / NET: 250 mL        PHYSICAL EXAM:  Vital Signs Last 24 Hrs  T(C): 36.7 (25 Mar 2024 10:00), Max: 36.9 (24 Mar 2024 12:24)  T(F): 98 (25 Mar 2024 10:00), Max: 98.5 (24 Mar 2024 12:24)  HR: 87 (25 Mar 2024 10:00) (67 - 87)  BP: 189/98 (25 Mar 2024 10:00) (133/81 - 189/98)  BP(mean): 136 (25 Mar 2024 10:00) (136 - 136)  RR: 16 (25 Mar 2024 10:00) (16 - 18)  SpO2: 96% (25 Mar 2024 10:00) (94% - 97%)    Parameters below as of 25 Mar 2024 10:00  Patient On (Oxygen Delivery Method): room air      GENERAL: NAD, well-developed  HEAD:  Atraumatic, Normocephalic  EYES: EOMI, PERRLA, conjunctiva and sclera clear  NECK: Supple, No JVD  CHEST/LUNG: Clear to auscultation bilaterally; No wheeze  HEART: Regular rate and rhythm; No murmurs, rubs, or gallops  ABDOMEN: Soft, Nontender, Nondistended; Bowel sounds present  EXTREMITIES:  2+ Peripheral Pulses, No clubbing, cyanosis, or edema  NEUROLOGY: AAOx3; non-focal  SKIN: No rashes or lesions    LABS:                        13.6   6.27  )-----------( 245      ( 25 Mar 2024 07:56 )             40.5     03-25    143  |  104  |  14  ----------------------------<  93  3.9   |  24  |  1.34<H>    Ca    9.5      25 Mar 2024 07:56  Phos  2.2     03-25  Mg     2.2     03-25    TPro  7.2  /  Alb  4.6  /  TBili  0.4  /  DBili  x   /  AST  33  /  ALT  59<H>  /  AlkPhos  87  03-24    PT/INR - ( 24 Mar 2024 01:03 )   PT: 9.8 sec;   INR: 0.89 ratio         PTT - ( 25 Mar 2024 08:19 )  PTT:69.2 sec  CARDIAC MARKERS ( 25 Mar 2024 07:56 )  x     / x     / 121 U/L / x     / 4.2 ng/mL  CARDIAC MARKERS ( 25 Mar 2024 00:55 )  x     / x     / 138 U/L / x     / 5.2 ng/mL  CARDIAC MARKERS ( 24 Mar 2024 03:31 )  x     / x     / 176 U/L / x     / 11.5 ng/mL  CARDIAC MARKERS ( 24 Mar 2024 01:57 )  x     / x     / 149 U/L / x     / 8.7 ng/mL  CARDIAC MARKERS ( 24 Mar 2024 01:03 )  x     / x     / 135 U/L / x     / 6.7 ng/mL      Urinalysis Basic - ( 25 Mar 2024 07:56 )    Color: x / Appearance: x / SG: x / pH: x  Gluc: 93 mg/dL / Ketone: x  / Bili: x / Urobili: x   Blood: x / Protein: x / Nitrite: x   Leuk Esterase: x / RBC: x / WBC x   Sq Epi: x / Non Sq Epi: x / Bacteria: x        RADIOLOGY & ADDITIONAL TESTS:    Imaging Personally Reviewed:    Consultant(s) Notes Reviewed:  cardiology    Care Discussed with Consultants/Other Providers: Dr. Vance (cards)

## 2024-03-25 NOTE — PROGRESS NOTE ADULT - SUBJECTIVE AND OBJECTIVE BOX
DATE OF SERVICE: 03-25-24 @ 12:41    Patient is a 64y old  Male who presents with a chief complaint of chest pain (25 Mar 2024 11:51)      INTERVAL HISTORY: Reports chest pain overnight, plan for cath today    REVIEW OF SYSTEMS:  CONSTITUTIONAL: No weakness  EYES/ENT: No visual changes;  No throat pain   NECK: No pain or stiffness  RESPIRATORY: No cough, wheezing; No shortness of breath  CARDIOVASCULAR: No chest pain or palpitations  GASTROINTESTINAL: No abdominal  pain. No nausea, vomiting, or hematemesis  GENITOURINARY: No dysuria, frequency or hematuria  NEUROLOGICAL: No stroke like symptoms  SKIN: No rashes    TELEMETRY Personally reviewed: SR 60-80  	  MEDICATIONS:  losartan 50 milliGRAM(s) Oral daily        PHYSICAL EXAM:  T(C): 36.7 (03-25-24 @ 12:20), Max: 36.9 (03-24-24 @ 14:29)  HR: 73 (03-25-24 @ 12:30) (67 - 87)  BP: 115/60 (03-25-24 @ 12:30) (115/60 - 189/98)  RR: 16 (03-25-24 @ 12:30) (16 - 18)  SpO2: 94% (03-25-24 @ 12:30) (94% - 97%)  Wt(kg): --  I&O's Summary    24 Mar 2024 07:01  -  25 Mar 2024 07:00  --------------------------------------------------------  IN: 250 mL / OUT: 0 mL / NET: 250 mL          Appearance: In no distress	  HEENT:    PERRL, EOMI	  Cardiovascular:  S1 S2, No JVD  Respiratory: Lungs clear to auscultation	  Gastrointestinal:  Soft, Non-tender, + BS	  Vascularature:  No edema of LE  Psychiatric: Appropriate affect   Neuro: no acute focal deficits                               13.6   6.27  )-----------( 245      ( 25 Mar 2024 07:56 )             40.5     03-25    143  |  104  |  14  ----------------------------<  93  3.9   |  24  |  1.34<H>    Ca    9.5      25 Mar 2024 07:56  Phos  2.2     03-25  Mg     2.2     03-25    TPro  7.2  /  Alb  4.6  /  TBili  0.4  /  DBili  x   /  AST  33  /  ALT  59<H>  /  AlkPhos  87  03-24        Labs personally reviewed      ASSESSMENT/PLAN: 	  65 yo M with HTN, HLD, p/w chest pain ruled in for NSTEMI    Problem/Plan - 1:  ·  Problem: NSTEMI (non-ST elevation myocardial infarction).   ·  Plan: aspirin, s/p brilinta load, on brilinta 90mg q12hr  - TTE revealing EF 65%, no regional wall abnormalities   - trend troponins to peak  - Plan for [facility] Monday, if develops cp sooner then will cath earlier  - tele monitoring.  - Discussed with OP cards    Problem/Plan - 2:  ·  Problem: Hypertension.   ·  Plan: on irbesartan 150mg at home, here equiv is losartan 50mg, hold thiazide.    Problem/Plan - 3:  ·  Problem: Hyperlipidemia.   ·  Plan: change to high intensity statin 80mg   c/w niacin.          DAVE Worthington DO [facility]  Cardiovascular Medicine  [address], [address]  Available via call or text on [employer] TEAMs  Office: [phone]

## 2024-03-25 NOTE — DISCHARGE NOTE PROVIDER - CARE PROVIDERS DIRECT ADDRESSES
97.9 [email or web address]

## 2024-03-25 NOTE — DISCHARGE NOTE PROVIDER - NSDCMRMEDTOKEN_GEN_ALL_CORE_FT
Aspirin Low Strength 81 mg oral delayed release tablet: 1 tab(s) orally once a day  irbesartan-hydrochlorothiazide 150 mg-12.5 mg oral tablet: 1 tab(s) orally once a day  niacin 500 mg oral capsule, extended release: 1 cap(s) orally once a day  ticagrelor 90 mg oral tablet: 1 tab(s) orally every 12 hours Please check for coverage and call [phone]  Zocor 40 mg oral tablet: 1 tab(s) orally once a day   Adult Aspirin Regimen 81 mg oral delayed release tablet: 1 tab(s) orally once a day  irbesartan-hydrochlorothiazide 150 mg-12.5 mg oral tablet: 1 tab(s) orally once a day  niacin 500 mg oral capsule, extended release: 1 cap(s) orally once a day  ticagrelor 90 mg oral tablet: 1 tab(s) orally every 12 hours Please check for coverage and call [phone]  Zocor 40 mg oral tablet: 1 tab(s) orally once a day

## 2024-03-25 NOTE — DISCHARGE NOTE PROVIDER - HOSPITAL COURSE
HPI:  65 y/o male w/ PMH HTN, HLD, CAD w/ 2 stents 12 years ago c/o 1 week history of intermittent left-sided chest pain w/ radiation to the neck, saw cardiologist, scheduled for stress test this Monday. Pt then had 2 day history of increasing left-sided chest pain that worsened today w/ radiation to the neck, prompting ED arrival. PAin was relieved by resting. Pt's pain has currently improved, taken 405mg ASA prior to ED arrival. . Denies fevers, chills, nausea, vomiting, dizziness, SOB, abdominal pain, dysuria,  (24 Mar 2024 12:38)    Hospital Course:  NSTEMI (non-ST elevation myocardial infarction).   ·  Plan: aspirin, s/p brilinta load, on brilinta 90mg q12hr  TTE with normal LVEF, no WMA  - s/p PCI/VISHAL x 2 pCX 80%   - s/p staged PCI to pLAD  - continue asa, brilinta, high intensity statin    * brilinta with $175 monthly copay. Pt is willing to pay the copay.    Hypertension.   ·  Plan: on irbesartan 150mg at home, here equiv is losartan 50mg, hold thiazide.    Hyperlipidemia.   ·  Plan: change to high intensity statin 80mg   c/w niacin.    Important Medication Changes and Reason:    Active or Pending Issues Requiring Follow-up:    Advanced Directives:   [ x] Full code  [ ] DNR  [ ] Hospice    Discharge Diagnoses:  NSTEMI  HTN

## 2024-03-25 NOTE — DISCHARGE NOTE PROVIDER - NSDCCPCAREPLAN_GEN_ALL_CORE_FT
PRINCIPAL DISCHARGE DIAGNOSIS  Diagnosis: Chest pain  Assessment and Plan of Treatment: HOME CARE INSTRUCTIONS  For the next few days, avoid physical activities that bring on chest pain. Continue physical activities as directed.  Do not smoke.  Avoid drinking alcohol.   Only take over-the-counter or prescription medicine for pain, discomfort, or fever as directed by your caregiver.  Follow your caregiver's suggestions for further testing if your chest pain does not go away.  Keep any follow-up appointments you made. If you do not go to an appointment, you could develop lasting (chronic) problems with pain. If there is any problem keeping an appointment, you must call to reschedule.   SEEK MEDICAL CARE IF:  You think you are having problems from the medicine you are taking. Read your medicine instructions carefully.  Your chest pain does not go away, even after treatment.  You develop a rash with blisters on your chest.  SEEK IMMEDIATE MEDICAL CARE IF:  You have increased chest pain or pain that spreads to your arm, neck, jaw, back, or abdomen.   You develop shortness of breath, an increasing cough, or you are coughing up blood.  You have severe back or abdominal pain, feel nauseous, or vomit.  You develop severe weakness, fainting, or chills.  You have a fever.  THIS IS AN EMERGENCY. Do not wait to see if the pain will go away. Get medical help at once. Call your local emergency services. Do not drive yourself to the hospital.      SECONDARY DISCHARGE DIAGNOSES  Diagnosis: NSTEMI (non-ST elevation myocardial infarction)  Assessment and Plan of Treatment: Stent pLCx x 2 and pLAD x 1  Continue DAPT (aspirin 81mg and Brilinita 90 mg BID)  - Avoid using NSAIDs  (Aleve, Motrin, ibuprofen, naproxen) while on DAPT, please utilize Tylenol for pain control (not to exceed 4gm in 24 hours)  - Patient aware to take DAPT  as prescribed and DO NOT STOP taking without consulting cardiologist first or STENT/s WILL CLOSE  -  site complications ( eg: bleeding, excruciating pain at the procedural site, large swelling ball size-  extremity numbness, tingling, temperature change), or CHEST PAIN; pt aware that if any of those occur he/she must call cardiologist IMMEDIATELY or 911 or go to nearest emergency room   -f/u appt in 2 weeks post dc with outpt cardiologist      Diagnosis: Hypertension  Assessment and Plan of Treatment: Take medications for your blood pressure as recommended.  Eat a heart healthy diet that is low in saturated fats and salt, and includes whole grains, fruits, vegetables and lean protein   Exercise regularly (consult with your physician or cardiologist first); maintain a heart healthy weight.   If you smoke - quit (A resource to help you stop smoking is the [facility] – phone number [phone].). Continue to follow with your primary physician or cardiologist.   Seek medical help for dizziness, Lightheadedness, Blurry vision, Headache, Chest pain, Shortness of breath  Follow up with your medical doctor to establish long term blood pressure treatment goals.

## 2024-03-25 NOTE — DISCHARGE NOTE PROVIDER - CARE PROVIDER_API CALL
Asim Vance  Cardiovascular Disease  [address], [address]  [place] [address]  Phone: [phone]  Fax: [phone]  Follow Up Time:

## 2024-03-26 LAB
ANION GAP SERPL CALC-SCNC: 13 MMOL/L — SIGNIFICANT CHANGE UP (ref 5–17)
APTT BLD: 30.1 SEC — SIGNIFICANT CHANGE UP (ref 24.5–35.6)
BUN SERPL-MCNC: 15 MG/DL — SIGNIFICANT CHANGE UP (ref 7–23)
CALCIUM SERPL-MCNC: 9.4 MG/DL — SIGNIFICANT CHANGE UP (ref 8.4–10.5)
CHLORIDE SERPL-SCNC: 109 MMOL/L — HIGH (ref 96–108)
CO2 SERPL-SCNC: 22 MMOL/L — SIGNIFICANT CHANGE UP (ref 22–31)
CREAT SERPL-MCNC: 1.18 MG/DL — SIGNIFICANT CHANGE UP (ref 0.5–1.3)
EGFR: 69 ML/MIN/1.73M2 — SIGNIFICANT CHANGE UP
GLUCOSE SERPL-MCNC: 93 MG/DL — SIGNIFICANT CHANGE UP (ref 70–99)
HCT VFR BLD CALC: 36.9 % — LOW (ref 39–50)
HGB BLD-MCNC: 12.4 G/DL — LOW (ref 13–17)
MCHC RBC-ENTMCNC: 30.5 PG — SIGNIFICANT CHANGE UP (ref 27–34)
MCHC RBC-ENTMCNC: 33.6 GM/DL — SIGNIFICANT CHANGE UP (ref 32–36)
MCV RBC AUTO: 90.9 FL — SIGNIFICANT CHANGE UP (ref 80–100)
MRSA PCR RESULT.: SIGNIFICANT CHANGE UP
NRBC # BLD: 0 /100 WBCS — SIGNIFICANT CHANGE UP (ref 0–0)
PLATELET # BLD AUTO: 220 K/UL — SIGNIFICANT CHANGE UP (ref 150–400)
POTASSIUM SERPL-MCNC: 4 MMOL/L — SIGNIFICANT CHANGE UP (ref 3.5–5.3)
POTASSIUM SERPL-SCNC: 4 MMOL/L — SIGNIFICANT CHANGE UP (ref 3.5–5.3)
RBC # BLD: 4.06 M/UL — LOW (ref 4.2–5.8)
RBC # FLD: 12.1 % — SIGNIFICANT CHANGE UP (ref 10.3–14.5)
S AUREUS DNA NOSE QL NAA+PROBE: SIGNIFICANT CHANGE UP
SODIUM SERPL-SCNC: 144 MMOL/L — SIGNIFICANT CHANGE UP (ref 135–145)
WBC # BLD: 6.93 K/UL — SIGNIFICANT CHANGE UP (ref 3.8–10.5)
WBC # FLD AUTO: 6.93 K/UL — SIGNIFICANT CHANGE UP (ref 3.8–10.5)

## 2024-03-26 PROCEDURE — 93010 ELECTROCARDIOGRAM REPORT: CPT

## 2024-03-26 PROCEDURE — 93010 ELECTROCARDIOGRAM REPORT: CPT | Mod: 77

## 2024-03-26 PROCEDURE — 99152 MOD SED SAME PHYS/QHP 5/>YRS: CPT

## 2024-03-26 PROCEDURE — 99233 SBSQ HOSP IP/OBS HIGH 50: CPT

## 2024-03-26 PROCEDURE — 92928 PRQ TCAT PLMT NTRAC ST 1 LES: CPT | Mod: LD

## 2024-03-26 RX ORDER — SODIUM CHLORIDE 9 MG/ML
1000 INJECTION INTRAMUSCULAR; INTRAVENOUS; SUBCUTANEOUS
Refills: 0 | Status: COMPLETED | OUTPATIENT
Start: 2024-03-26 | End: 2024-03-26

## 2024-03-26 RX ORDER — CHLORHEXIDINE GLUCONATE 213 G/1000ML
1 SOLUTION TOPICAL DAILY
Refills: 0 | Status: DISCONTINUED | OUTPATIENT
Start: 2024-03-26 | End: 2024-03-27

## 2024-03-26 RX ORDER — SODIUM CHLORIDE 9 MG/ML
1000 INJECTION INTRAMUSCULAR; INTRAVENOUS; SUBCUTANEOUS
Refills: 0 | Status: DISCONTINUED | OUTPATIENT
Start: 2024-03-26 | End: 2024-03-27

## 2024-03-26 RX ORDER — SODIUM CHLORIDE 9 MG/ML
250 INJECTION INTRAMUSCULAR; INTRAVENOUS; SUBCUTANEOUS ONCE
Refills: 0 | Status: COMPLETED | OUTPATIENT
Start: 2024-03-26 | End: 2024-03-26

## 2024-03-26 RX ADMIN — SODIUM CHLORIDE 75 MILLILITER(S): 9 INJECTION INTRAMUSCULAR; INTRAVENOUS; SUBCUTANEOUS at 22:32

## 2024-03-26 RX ADMIN — TICAGRELOR 90 MILLIGRAM(S): 90 TABLET ORAL at 05:28

## 2024-03-26 RX ADMIN — Medication 650 MILLIGRAM(S): at 18:39

## 2024-03-26 RX ADMIN — Medication 500 MILLIGRAM(S): at 21:02

## 2024-03-26 RX ADMIN — CHLORHEXIDINE GLUCONATE 1 APPLICATION(S): 213 SOLUTION TOPICAL at 12:30

## 2024-03-26 RX ADMIN — TICAGRELOR 90 MILLIGRAM(S): 90 TABLET ORAL at 18:40

## 2024-03-26 RX ADMIN — SODIUM CHLORIDE 100 MILLILITER(S): 9 INJECTION INTRAMUSCULAR; INTRAVENOUS; SUBCUTANEOUS at 15:01

## 2024-03-26 RX ADMIN — Medication 1 SPRAY(S): at 05:29

## 2024-03-26 RX ADMIN — SODIUM CHLORIDE 75 MILLILITER(S): 9 INJECTION INTRAMUSCULAR; INTRAVENOUS; SUBCUTANEOUS at 16:58

## 2024-03-26 RX ADMIN — SODIUM CHLORIDE 75 MILLILITER(S): 9 INJECTION INTRAMUSCULAR; INTRAVENOUS; SUBCUTANEOUS at 14:57

## 2024-03-26 RX ADMIN — ATORVASTATIN CALCIUM 80 MILLIGRAM(S): 80 TABLET, FILM COATED ORAL at 21:02

## 2024-03-26 RX ADMIN — Medication 650 MILLIGRAM(S): at 19:39

## 2024-03-26 RX ADMIN — SODIUM CHLORIDE 75 MILLILITER(S): 9 INJECTION INTRAMUSCULAR; INTRAVENOUS; SUBCUTANEOUS at 16:56

## 2024-03-26 RX ADMIN — Medication 81 MILLIGRAM(S): at 12:29

## 2024-03-26 RX ADMIN — LOSARTAN POTASSIUM 50 MILLIGRAM(S): 100 TABLET, FILM COATED ORAL at 05:28

## 2024-03-26 RX ADMIN — SODIUM CHLORIDE 250 MILLILITER(S): 9 INJECTION INTRAMUSCULAR; INTRAVENOUS; SUBCUTANEOUS at 15:04

## 2024-03-26 NOTE — CHART NOTE - NSCHARTNOTEFT_GEN_A_CORE
Patient s/p LHC via RRA with LAD VISHAL.  Patient is alert and oriented on assessment. Denies chest pain, SOB, dizziness, NV, abdominal pain, RUE numbness or weakness. Patient endorses some pain at access site, however, states pain is improving compared to prior.   RRA access site assessed with no evidence of hematoma, swelling, or signs of bleeding. Dressing is clean, dry, and intact.   Radial pulses are palpable and symmetric bilaterally. Patient has full active ROM of the right fingers.   RUE appropriately warm to touch with normal capillary refill.  Vital signs stable as below.  Will continue to monitor.     Vital Signs Last 24 Hrs  T(C): 36.6 (26 Mar 2024 20:32), Max: 36.7 (26 Mar 2024 04:39)  T(F): 97.9 (26 Mar 2024 20:32), Max: 98.1 (26 Mar 2024 11:06)  HR: 73 (26 Mar 2024 20:32) (67 - 80)  BP: 161/84 (26 Mar 2024 20:32) (145/67 - 169/72)  BP(mean): --  RR: 18 (26 Mar 2024 20:32) (15 - 18)  SpO2: 94% (26 Mar 2024 20:32) (94% - 96%)    Parameters below as of 26 Mar 2024 20:32  Patient On (Oxygen Delivery Method): room air    Nivia Palumbo PA-C  Dept. of Medicine  Spectra [id number] Patient s/p LHC via RRA with LAD VISHAL today.  Patient is alert and oriented on assessment. Denies chest pain, SOB, dizziness, NV, abdominal pain, RUE numbness or weakness. Patient endorses some postprocedure pain at access site, however, states pain is improving compared to earlier.   RRA access site assessed with no evidence of hematoma, swelling, or signs of bleeding. Dressing is clean, dry, and intact.   There is some areas of mild bruising noted surrounding dressing which patient states are present from this access site being used for procedure the previous day (s/p VISHAL x 2 pLCx via RRA access on 3/25)  Radial pulses are palpable and symmetric bilaterally.   Patient has full active ROM of the right fingers.   RUE appropriately warm to touch with normal capillary refill.  Vital signs stable as below.  Will continue to monitor.     Vital Signs Last 24 Hrs  T(C): 36.6 (26 Mar 2024 20:32), Max: 36.7 (26 Mar 2024 04:39)  T(F): 97.9 (26 Mar 2024 20:32), Max: 98.1 (26 Mar 2024 11:06)  HR: 73 (26 Mar 2024 20:32) (67 - 80)  BP: 161/84 (26 Mar 2024 20:32) (145/67 - 169/72)  BP(mean): --  RR: 18 (26 Mar 2024 20:32) (15 - 18)  SpO2: 94% (26 Mar 2024 20:32) (94% - 96%)    Parameters below as of 26 Mar 2024 20:32  Patient On (Oxygen Delivery Method): room air    Nivia Palumbo PA-C  Dept. of Medicine  Spectra [phone]

## 2024-03-26 NOTE — PROGRESS NOTE ADULT - SUBJECTIVE AND OBJECTIVE BOX
Ariana Tran MD  Division of Hospital Medicine  Please contact via MS Teams (prefer message first)  Office: [phone]    Patient is a 64y old  Male who presents with a chief complaint of chest pain (25 Mar 2024 12:41)      SUBJECTIVE / OVERNIGHT EVENTS:  no acute events overnight, vss, afebrile  s/p PCI/VISHAL x 2 pCX 80%   planned for staged PCI to pLAD 70%  pt has no complaints this morning    ROS:  14 point ROS negative in detail except stated as above    MEDICATIONS  (STANDING):  aspirin  chewable 81 milliGRAM(s) Oral daily  atorvastatin 80 milliGRAM(s) Oral at bedtime  fluticasone propionate 50 MICROgram(s)/spray Nasal Spray 1 Spray(s) Both Nostrils two times a day  losartan 50 milliGRAM(s) Oral daily  niacin  milliGRAM(s) Oral at bedtime  sodium chloride 0.9%. 1000 milliLiter(s) (75 mL/Hr) IV Continuous <Continuous>  sodium chloride 0.9%. 1000 milliLiter(s) (100 mL/Hr) IV Continuous <Continuous>  ticagrelor 90 milliGRAM(s) Oral every 12 hours    MEDICATIONS  (PRN):  acetaminophen     Tablet .. 650 milliGRAM(s) Oral every 6 hours PRN Temp greater or equal to 38C (100.4F), Mild Pain (1 - 3)  aluminum hydroxide/magnesium hydroxide/simethicone Suspension 30 milliLiter(s) Oral every 4 hours PRN Dyspepsia  melatonin 3 milliGRAM(s) Oral at bedtime PRN Insomnia  ondansetron Injectable 4 milliGRAM(s) IV Push every 8 hours PRN Nausea and/or Vomiting      CAPILLARY BLOOD GLUCOSE        I&O's Summary    26 Mar 2024 07:01  -  26 Mar 2024 08:49  --------------------------------------------------------  IN: 240 mL / OUT: 0 mL / NET: 240 mL        PHYSICAL EXAM:  Vital Signs Last 24 Hrs  T(C): 36.7 (26 Mar 2024 04:39), Max: 36.9 (25 Mar 2024 20:15)  T(F): 98 (26 Mar 2024 04:39), Max: 98.4 (25 Mar 2024 20:15)  HR: 73 (26 Mar 2024 04:39) (68 - 87)  BP: 153/87 (26 Mar 2024 04:39) (105/62 - 189/98)  BP(mean): 107 (25 Mar 2024 15:30) (79 - 136)  RR: 18 (26 Mar 2024 04:39) (15 - 18)  SpO2: 95% (26 Mar 2024 04:39) (94% - 97%)    Parameters below as of 26 Mar 2024 04:39  Patient On (Oxygen Delivery Method): room air      GENERAL: NAD, well-developed  HEAD:  Atraumatic, Normocephalic  EYES: EOMI, PERRLA, conjunctiva and sclera clear  NECK: Supple, No JVD  CHEST/LUNG: Clear to auscultation bilaterally; No wheeze  HEART: Regular rate and rhythm; No murmurs, rubs, or gallops  ABDOMEN: Soft, Nontender, Nondistended; Bowel sounds present  EXTREMITIES:  2+ Peripheral Pulses, No clubbing, cyanosis, or edema  NEUROLOGY: AAOx3; non-focal  SKIN: No rashes or lesions    LABS:                        12.4   6.93  )-----------( 220      ( 26 Mar 2024 06:50 )             36.9     03-26    144  |  109<H>  |  15  ----------------------------<  93  4.0   |  22  |  1.18    Ca    9.4      26 Mar 2024 06:50  Phos  2.2     03-25  Mg     2.2     03-25      PTT - ( 26 Mar 2024 06:50 )  PTT:30.1 sec  CARDIAC MARKERS ( 25 Mar 2024 07:56 )  x     / x     / 121 U/L / x     / 4.2 ng/mL  CARDIAC MARKERS ( 25 Mar 2024 00:55 )  x     / x     / 138 U/L / x     / 5.2 ng/mL      Urinalysis Basic - ( 26 Mar 2024 06:50 )    Color: x / Appearance: x / SG: x / pH: x  Gluc: 93 mg/dL / Ketone: x  / Bili: x / Urobili: x   Blood: x / Protein: x / Nitrite: x   Leuk Esterase: x / RBC: x / WBC x   Sq Epi: x / Non Sq Epi: x / Bacteria: x        RADIOLOGY & ADDITIONAL TESTS:    Imaging Personally Reviewed:    Consultant(s) Notes Reviewed:  cardiology    Care Discussed with Consultants/Other Providers:

## 2024-03-26 NOTE — PROGRESS NOTE ADULT - SUBJECTIVE AND OBJECTIVE BOX
DATE OF SERVICE: 03-26-24 @ 16:01    Patient is a 64y old  Male who presents with a chief complaint of chest pain (26 Mar 2024 10:57)      INTERVAL HISTORY: Feels ok.     REVIEW OF SYSTEMS:  CONSTITUTIONAL: No weakness  EYES/ENT: No visual changes;  No throat pain   NECK: No pain or stiffness  RESPIRATORY: No cough, wheezing; No shortness of breath  CARDIOVASCULAR: No chest pain or palpitations  GASTROINTESTINAL: No abdominal  pain. No nausea, vomiting, or hematemesis  GENITOURINARY: No dysuria, frequency or hematuria  NEUROLOGICAL: No stroke like symptoms  SKIN: No rashes    TELEMETRY Personally reviewed: SR 80-90s  	  MEDICATIONS:  losartan 50 milliGRAM(s) Oral daily        PHYSICAL EXAM:  T(C): 36.7 (03-26-24 @ 13:57), Max: 36.9 (03-25-24 @ 20:15)  HR: 80 (03-26-24 @ 13:57) (68 - 80)  BP: 165/82 (03-26-24 @ 13:57) (138/78 - 165/82)  RR: 16 (03-26-24 @ 13:57) (16 - 18)  SpO2: 95% (03-26-24 @ 13:57) (95% - 96%)  Wt(kg): --  I&O's Summary    26 Mar 2024 07:01  -  26 Mar 2024 16:01  --------------------------------------------------------  IN: 480 mL / OUT: 0 mL / NET: 480 mL          Appearance: In no distress	  HEENT:    PERRL, EOMI	  Cardiovascular:  S1 S2, No JVD  Respiratory: Lungs clear to auscultation	  Gastrointestinal:  Soft, Non-tender, + BS	  Vascularature:  No edema of LE  Psychiatric: Appropriate affect   Neuro: no acute focal deficits                               12.4   6.93  )-----------( 220      ( 26 Mar 2024 06:50 )             36.9     03-26    144  |  109<H>  |  15  ----------------------------<  93  4.0   |  22  |  1.18    Ca    9.4      26 Mar 2024 06:50  Phos  2.2     03-25  Mg     2.2     03-25          Labs personally reviewed      ASSESSMENT/PLAN: 	    63 yo M with HTN, HLD, p/w chest pain ruled in for NSTEMI    Problem/Plan - 1:  ·  Problem: NSTEMI (non-ST elevation myocardial infarction).   ·  Plan: aspirin, s/p brilinta load, on brilinta 90mg q12hr  - TTE revealing EF 65%, no regional wall abnormalities   - trend troponins to peak  - tele monitoring.  - s/p VISHAL x2 of pLCx; Plan for staged PCI of pLAD today    Problem/Plan - 2:  ·  Problem: Hypertension.   ·  Plan: on irbesartan 150mg at home, here equiv is losartan 50mg, hold thiazide.    Problem/Plan - 3:  ·  Problem: Hyperlipidemia.   ·  Plan: change to high intensity statin 80mg   c/w niacin.          Ana Lee, CARLIE-NP   Asim Vance DO [facility]  Cardiovascular Medicine  [address] [place] [address], [address]  Available through call or text on [employer] TE[employer]  Office: [phone]

## 2024-03-26 NOTE — PROGRESS NOTE ADULT - SUBJECTIVE AND OBJECTIVE BOX
Interventional Cardiology Post Cath Progress Note:                Subjective:   Patient feels well- no current complaints- Denies chest pain, shortness of breath. Denies pain, numbness, tingling or swelling around wrist access site    Tele 24hrs: SR 70's      MEDICATIONS  (STANDING):  aspirin  chewable 81 milliGRAM(s) Oral daily  atorvastatin 80 milliGRAM(s) Oral at bedtime  chlorhexidine 2% Cloths 1 Application(s) Topical daily  fluticasone propionate 50 MICROgram(s)/spray Nasal Spray 1 Spray(s) Both Nostrils two times a day  losartan 50 milliGRAM(s) Oral daily  niacin  milliGRAM(s) Oral at bedtime  sodium chloride 0.9%. 1000 milliLiter(s) (75 mL/Hr) IV Continuous <Continuous>  sodium chloride 0.9%. 1000 milliLiter(s) (100 mL/Hr) IV Continuous <Continuous>  ticagrelor 90 milliGRAM(s) Oral every 12 hours    MEDICATIONS  (PRN):  acetaminophen     Tablet .. 650 milliGRAM(s) Oral every 6 hours PRN Temp greater or equal to 38C (100.4F), Mild Pain (1 - 3)  aluminum hydroxide/magnesium hydroxide/simethicone Suspension 30 milliLiter(s) Oral every 4 hours PRN Dyspepsia  melatonin 3 milliGRAM(s) Oral at bedtime PRN Insomnia  ondansetron Injectable 4 milliGRAM(s) IV Push every 8 hours PRN Nausea and/or Vomiting      Objective:  Vital Signs Last 24 Hrs  T(C): 36.7 (26 Mar 2024 04:39), Max: 36.9 (25 Mar 2024 20:15)  T(F): 98 (26 Mar 2024 04:39), Max: 98.4 (25 Mar 2024 20:15)  HR: 73 (26 Mar 2024 04:39) (68 - 79)  BP: 153/87 (26 Mar 2024 04:39) (105/62 - 162/74)  BP(mean): 107 (25 Mar 2024 15:30) (79 - 107)  RR: 18 (26 Mar 2024 04:39) (15 - 18)  SpO2: 95% (26 Mar 2024 04:39) (94% - 97%)    Parameters below as of 26 Mar 2024 04:39  Patient On (Oxygen Delivery Method): room air        03-26-24 @ 07:01  -  03-26-24 @ 10:58  --------------------------------------------------------  IN: 240 mL / OUT: 0 mL / NET: 240 mL                              12.4   6.93  )-----------( 220      ( 26 Mar 2024 06:50 )             36.9     03-26    144  |  109<H>  |  15  ----------------------------<  93  4.0   |  22  |  1.18    Ca    9.4      26 Mar 2024 06:50  Phos  2.2     03-25  Mg     2.2     03-25      PTT - ( 26 Mar 2024 06:50 )  PTT:30.1 sec  Urinalysis Basic - ( 26 Mar 2024 06:50 )    Color: x / Appearance: x / SG: x / pH: x  Gluc: 93 mg/dL / Ketone: x  / Bili: x / Urobili: x   Blood: x / Protein: x / Nitrite: x   Leuk Esterase: x / RBC: x / WBC x   Sq Epi: x / Non Sq Epi: x / Bacteria: x      Physical Exam:  Constitutional:  No apparent distress, alert and oriented times three, appropriate affect  Neck:  JVD is not elevated, supple  Lungs:  Clear to auscultation with no wheezing, ronchi or crackles  CV:  +S1/S2 Regular rate and rhythm with no murmur, rub or gallop  Abdomen: Positive bowel sounds, soft, non-tender, non-distended   Right Upper Extremity: Soft, non tender, no bleeding or hematoma, area ecchymotic, RUE +2 palpable radial pulse        Assessment/Plan:   63 y/o male w/ PMH HTN, HLD, CAD w/ 2 stents 12 years ago c/o 1 week history of intermittent left-sided chest pain w/ radiation to the neck, saw cardiologist, scheduled for stress test this Monday. Pt then had 2 day history of increasing left-sided chest pain that worsened today w/ radiation to the neck, prompting ED arrival. PAin was relieved by resting. Pt's pain has currently improved, taken 405mg ASA prior to ED arrival. . Denies fevers, chills, nausea, vomiting, dizziness, SOB, abdominal pain, dysuria  3/25 s/p VISHAL x 2 pLCx via RRA access    #CAD s/p PCI   - staged PCI to LAD today  - radial site stable.   - Continue DAPT ASA 81mg/Brilinta 90mg BID  - Continue atorvastatin 80mg  - Recommend a heart healthy diet which includes a variety of fruits and vegetables, whole grains, low fat dairy products, legumes and skinless poulty and fish; food prepared with little or no salt and minimize processed foods  - Avoid using NSAIDs  (Aleve, Motrin, ibuprofen, naproxen) while on DAPT, please utilize Tylenol for pain control (not to exceed 4gm in 24 hours)  - Care per primary team    Please check [email or web address] password [email or web address] for cardiology service schedule and contact information via TEAMS.    Ashley Lennon NP  [phone]

## 2024-03-26 NOTE — PROGRESS NOTE ADULT - TIME BILLING
patient encounter, reviewing tests and imaging, independently obtaining a history, performing a physical examination, discussing the plan with the patient, ordering medications/tests, documenting clinical information, and coordinating care. This excludes any time spent on teaching.
patient encounter, reviewing tests and imaging, independently obtaining a history, performing a physical examination, discussing the plan with the patient, ordering medications/tests, documenting clinical information, and coordinating care. This excludes any time spent on teaching.

## 2024-03-27 ENCOUNTER — TRANSCRIPTION ENCOUNTER (OUTPATIENT)
Age: 64
End: 2024-03-27

## 2024-03-27 VITALS
DIASTOLIC BLOOD PRESSURE: 72 MMHG | TEMPERATURE: 98 F | HEART RATE: 70 BPM | OXYGEN SATURATION: 96 % | SYSTOLIC BLOOD PRESSURE: 158 MMHG | RESPIRATION RATE: 18 BRPM

## 2024-03-27 LAB
HCT VFR BLD CALC: 36.1 % — LOW (ref 39–50)
HGB BLD-MCNC: 12 G/DL — LOW (ref 13–17)
MCHC RBC-ENTMCNC: 30.7 PG — SIGNIFICANT CHANGE UP (ref 27–34)
MCHC RBC-ENTMCNC: 33.2 GM/DL — SIGNIFICANT CHANGE UP (ref 32–36)
MCV RBC AUTO: 92.3 FL — SIGNIFICANT CHANGE UP (ref 80–100)
NRBC # BLD: 0 /100 WBCS — SIGNIFICANT CHANGE UP (ref 0–0)
PLATELET # BLD AUTO: 219 K/UL — SIGNIFICANT CHANGE UP (ref 150–400)
RBC # BLD: 3.91 M/UL — LOW (ref 4.2–5.8)
RBC # FLD: 12.2 % — SIGNIFICANT CHANGE UP (ref 10.3–14.5)
WBC # BLD: 8.13 K/UL — SIGNIFICANT CHANGE UP (ref 3.8–10.5)
WBC # FLD AUTO: 8.13 K/UL — SIGNIFICANT CHANGE UP (ref 3.8–10.5)

## 2024-03-27 PROCEDURE — 85027 COMPLETE CBC AUTOMATED: CPT

## 2024-03-27 PROCEDURE — 71046 X-RAY EXAM CHEST 2 VIEWS: CPT

## 2024-03-27 PROCEDURE — 99239 HOSP IP/OBS DSCHRG MGMT >30: CPT

## 2024-03-27 PROCEDURE — 87641 MR-STAPH DNA AMP PROBE: CPT

## 2024-03-27 PROCEDURE — 87640 STAPH A DNA AMP PROBE: CPT

## 2024-03-27 PROCEDURE — 84100 ASSAY OF PHOSPHORUS: CPT

## 2024-03-27 PROCEDURE — 83735 ASSAY OF MAGNESIUM: CPT

## 2024-03-27 PROCEDURE — 80053 COMPREHEN METABOLIC PANEL: CPT

## 2024-03-27 PROCEDURE — 85730 THROMBOPLASTIN TIME PARTIAL: CPT

## 2024-03-27 PROCEDURE — 93454 CORONARY ARTERY ANGIO S&I: CPT | Mod: 59

## 2024-03-27 PROCEDURE — 85610 PROTHROMBIN TIME: CPT

## 2024-03-27 PROCEDURE — 86803 HEPATITIS C AB TEST: CPT

## 2024-03-27 PROCEDURE — 80048 BASIC METABOLIC PNL TOTAL CA: CPT

## 2024-03-27 PROCEDURE — 82550 ASSAY OF CK (CPK): CPT

## 2024-03-27 PROCEDURE — 84484 ASSAY OF TROPONIN QUANT: CPT

## 2024-03-27 PROCEDURE — 85025 COMPLETE CBC W/AUTO DIFF WBC: CPT

## 2024-03-27 PROCEDURE — C1887: CPT

## 2024-03-27 PROCEDURE — 83880 ASSAY OF NATRIURETIC PEPTIDE: CPT

## 2024-03-27 PROCEDURE — 93005 ELECTROCARDIOGRAM TRACING: CPT

## 2024-03-27 PROCEDURE — C9600: CPT | Mod: LD

## 2024-03-27 PROCEDURE — 93306 TTE W/DOPPLER COMPLETE: CPT

## 2024-03-27 PROCEDURE — C1769: CPT

## 2024-03-27 PROCEDURE — 82553 CREATINE MB FRACTION: CPT

## 2024-03-27 PROCEDURE — C1725: CPT

## 2024-03-27 PROCEDURE — 94640 AIRWAY INHALATION TREATMENT: CPT

## 2024-03-27 PROCEDURE — C1894: CPT

## 2024-03-27 PROCEDURE — 99285 EMERGENCY DEPT VISIT HI MDM: CPT

## 2024-03-27 PROCEDURE — 99231 SBSQ HOSP IP/OBS SF/LOW 25: CPT

## 2024-03-27 PROCEDURE — C1874: CPT

## 2024-03-27 PROCEDURE — 36415 COLL VENOUS BLD VENIPUNCTURE: CPT

## 2024-03-27 RX ORDER — ASPIRIN/CALCIUM CARB/MAGNESIUM 324 MG
1 TABLET ORAL
Qty: 30 | Refills: 0
Start: 2024-03-27 | End: 2024-04-25

## 2024-03-27 RX ORDER — ASPIRIN/CALCIUM CARB/MAGNESIUM 324 MG
1 TABLET ORAL
Refills: 0 | DISCHARGE

## 2024-03-27 RX ADMIN — Medication 81 MILLIGRAM(S): at 12:57

## 2024-03-27 RX ADMIN — LOSARTAN POTASSIUM 50 MILLIGRAM(S): 100 TABLET, FILM COATED ORAL at 05:10

## 2024-03-27 RX ADMIN — TICAGRELOR 90 MILLIGRAM(S): 90 TABLET ORAL at 05:10

## 2024-03-27 RX ADMIN — CHLORHEXIDINE GLUCONATE 1 APPLICATION(S): 213 SOLUTION TOPICAL at 12:57

## 2024-03-27 NOTE — PROGRESS NOTE ADULT - NSPROGADDITIONALINFOA_GEN_ALL_CORE
above plans discussed with medicine ACP Yisel
above plans discussed with medicine ACP Ana  stable for discharge  44 minutes spent on discharge process
above plans discussed with medicine ACP Ana

## 2024-03-27 NOTE — PROGRESS NOTE ADULT - PROBLEM SELECTOR PLAN 1
aspirin, s/p brilinta load, on brilinta 90mg q12hr  TTE with normal LVEF, no WMA  - LHC this morning  - continue asa, brilinta, high intensity statin    * brilinta with $175 monthly copay. Will discuss with patient see if willing to pay. Otherwise, will transition to plavix
aspirin, s/p brilinta load, on brilinta 90mg q12hr  TTE with normal LVEF, no WMA  -  s/p PCI to pCx x2 and staged PCI to pLAD  - continue asa, brilinta, high intensity statin    * brilinta with $175 monthly copay. Pt is willing to pay the copay.
aspirin, s/p brilinta load, on brilinta 90mg q12hr  TTE with normal LVEF, no WMA  - s/p PCI/VISHAL x 2 pCX 80%   - planned for staged PCI to pLAD this morning  - continue asa, brilinta, high intensity statin    * brilinta with $175 monthly copay. Pt is willing to pay the copay.

## 2024-03-27 NOTE — PHARMACOTHERAPY INTERVENTION NOTE - COMMENTS
Requested Brilinta 90 mg twice daily for ACS prescription to be sent to [facility]. The cost is $175$ with patient's insurance plan due to having $86 dollar going towards his deductible. Once deductible is met, copay should be $89/month. Free trial card will be applied to be $0 for the first fill.     Prescription will be filed and put on hold. Call [facility] when patient is closer to discharge and request prescription to be delivered med(s) to bed before 4pm.    Matthew (Leon Ramirez  Transitions of Care Pharmacist  Available on [employer] Teams (Preferred)  Spectra: [phone]  
Counseled patient/patient’s caregiver  on the following inpatient/discharge medications names (brand/generic), indication, and possible side effects:    MEDICATIONS  (STANDING):  aspirin  chewable 81 milliGRAM(s) Oral daily  atorvastatin 80 milliGRAM(s) Oral at bedtime  losartan 50 milliGRAM(s) Oral daily  niacin  milliGRAM(s) Oral at bedtime  ticagrelor 90 milliGRAM(s) Oral every 12 hours    - Stressed importance of compliance to DAPT therapy.  - Educated patient to avoid using NSAIDs  (Aleve, Motrin, ibuprofen, naproxen) while on DAPT, recommended to use Tylenol OTC for pain control (not to exceed 4gm in 24 hours)  - Patient was provided with a medication card for their new medication. Patient questions and concerns were answered and addressed. Patient demonstrated understanding.  - Pt would like all discharge RXs sent to [facility]. Write in pharmacy note: "meds to bed, date (MM:DD), time (HH:MM) on the scripts.    Matthew (Leon Ramirez  Transitions of Care Pharmacist  Available on Microsoft Teams (Preferred)  Spectra: [phone]

## 2024-03-27 NOTE — PROGRESS NOTE ADULT - ASSESSMENT
65 y/o male w/ PMH HTN, HLD, CAD w/ 2 stents 12 years ago c/o 1 week history of intermittent left-sided chest pain admitted for NSTEMI s/p PCI to pCx x2 and staged PCI to pLAD
65 y/o male w/ PMH HTN, HLD, CAD w/ 2 stents 12 years ago c/o 1 week history of intermittent left-sided chest pain admitted for NSTEMI pending [facility]
63 y/o male w/ PMH HTN, HLD, CAD w/ 2 stents 12 years ago c/o 1 week history of intermittent left-sided chest pain admitted for NSTEMI pending [facility]
Statement Selected

## 2024-03-27 NOTE — DISCHARGE NOTE NURSING/CASE MANAGEMENT/SOCIAL WORK - PATIENT PORTAL LINK FT
You can access the FollowMyHealth Patient Portal offered by [facility] by registering at the following website: [email or web address]. By joining [employer]’s FollowMyHealth portal, you will also be able to view your health information using other applications (apps) compatible with our system.

## 2024-03-27 NOTE — PROGRESS NOTE ADULT - PROVIDER SPECIALTY LIST ADULT
Cardiology
Cardiology
Intervent Cardiology
Cardiology
Intervent Cardiology
Hospitalist

## 2024-03-27 NOTE — PROGRESS NOTE ADULT - PROBLEM SELECTOR PLAN 2
on irbesartan 150mg at home, here equiv is losartan 50mg, hold thiazide

## 2024-03-27 NOTE — DISCHARGE NOTE NURSING/CASE MANAGEMENT/SOCIAL WORK - NSDCPEFALRISK_GEN_ALL_CORE
For information on Fall & Injury Prevention, visit: [email or web address] OR  [email or web address] OR  https://www.cdc.gov/steadi/patient.html

## 2024-03-27 NOTE — PROGRESS NOTE ADULT - SUBJECTIVE AND OBJECTIVE BOX
Interventional Cardiology Post Cath Progress Note:                Subjective:   patient seen on 6T room 603D in bed  Patient feels well- no current complaints- Denies chest pain, shortness of breath. Denies pain, numbness, tingling or swelling around right radial access site    Tele 24hrs:      MEDICATIONS  (STANDING):  aspirin  chewable 81 milliGRAM(s) Oral daily  atorvastatin 80 milliGRAM(s) Oral at bedtime  chlorhexidine 2% Cloths 1 Application(s) Topical daily  fluticasone propionate 50 MICROgram(s)/spray Nasal Spray 1 Spray(s) Both Nostrils two times a day  losartan 50 milliGRAM(s) Oral daily  niacin  milliGRAM(s) Oral at bedtime  sodium chloride 0.9%. 1000 milliLiter(s) (75 mL/Hr) IV Continuous <Continuous>  ticagrelor 90 milliGRAM(s) Oral every 12 hours    MEDICATIONS  (PRN):  acetaminophen     Tablet .. 650 milliGRAM(s) Oral every 6 hours PRN Temp greater or equal to 38C (100.4F), Mild Pain (1 - 3)  aluminum hydroxide/magnesium hydroxide/simethicone Suspension 30 milliLiter(s) Oral every 4 hours PRN Dyspepsia  melatonin 3 milliGRAM(s) Oral at bedtime PRN Insomnia  ondansetron Injectable 4 milliGRAM(s) IV Push every 8 hours PRN Nausea and/or Vomiting      Objective:  Vital Signs Last 24 Hrs  T(C): 36.6 (27 Mar 2024 05:08), Max: 36.8 (26 Mar 2024 23:54)  T(F): 97.8 (27 Mar 2024 05:08), Max: 98.2 (26 Mar 2024 23:54)  HR: 65 (27 Mar 2024 05:08) (64 - 80)  BP: 148/79 (27 Mar 2024 05:08) (145/67 - 169/72)  BP(mean): --  RR: 18 (27 Mar 2024 05:08) (15 - 18)  SpO2: 95% (27 Mar 2024 05:08) (94% - 96%)    Parameters below as of 27 Mar 2024 05:08  Patient On (Oxygen Delivery Method): room air        03-26-24 @ 07:01  -  03-27-24 @ 07:00  --------------------------------------------------------  IN: 480 mL / OUT: 0 mL / NET: 480 mL                              12.4   6.93  )-----------( 220      ( 26 Mar 2024 06:50 )             36.9     03-26    144  |  109<H>  |  15  ----------------------------<  93  4.0   |  22  |  1.18    Ca    9.4      26 Mar 2024 06:50  Phos  2.2     03-25  Mg     2.2     03-25      PTT - ( 26 Mar 2024 06:50 )  PTT:30.1 sec  Urinalysis Basic - ( 26 Mar 2024 06:50 )    Color: x / Appearance: x / SG: x / pH: x  Gluc: 93 mg/dL / Ketone: x  / Bili: x / Urobili: x   Blood: x / Protein: x / Nitrite: x   Leuk Esterase: x / RBC: x / WBC x   Sq Epi: x / Non Sq Epi: x / Bacteria: x      Physical Exam:  No apparent distress, alert and oriented times three, appropriate affect  Right Upper Extremity: Soft, non tender, no bleeding or hematoma, clean/dry/intact- RUE +2 palpable radial pulse    #CAD  3/25 PCI to pCx x2 80% via RRA  3/26 staged PCI to pLAD  via RRA    - right radial site is stable  - Continue DAPT (aspirin 81mg and Brilinita 90 mg BID)  - Continue statin  - Avoid using NSAIDs  (Aleve, Motrin, ibuprofen, naproxen) while on DAPT, please utilize Tylenol for pain control (not to exceed 4gm in 24 hours)  - Patient aware to take DAPT  as prescribed and DO NOT STOP taking without consulting cardiologist first or STENT/s WILL CLOSE  - Reviewed and reinforced with patient:  site complications ( eg: bleeding, excruciating pain at the procedural site, large swelling ball size-  extremity numbness, tingling, temperature change), or CHEST PAIN; pt aware that if any of those occur he/she must call cardiologist IMMEDIATELY or 911 or go to nearest emergency room   - Patient verbalizes understanding of ALL OF THE ABOVE, and gives positive feedback   -please make sure DAPT is prescribed to pt's preferred pharmacy on dc   -f/u appt in 2 weeks post dc with outpt cardiologist  - Keep Mg >2 K>4   - cont to monitor on tele  - all other care as per primary

## 2024-03-27 NOTE — PROGRESS NOTE ADULT - SUBJECTIVE AND OBJECTIVE BOX
Ariana Tran MD  Division of Hospital Medicine  Please contact via MS Teams (prefer message first)  Office: [phone]    Patient is a 64y old  Male who presents with a chief complaint of chest pain (27 Mar 2024 07:15)      SUBJECTIVE / OVERNIGHT EVENTS:  no acute events overnight, vss, afebrile  right wrist site swelling now improved  feeling well and ready to go home  will follow up with Dr. Haas this week    ROS:  14 point ROS negative in detail except stated as above    MEDICATIONS  (STANDING):  aspirin  chewable 81 milliGRAM(s) Oral daily  atorvastatin 80 milliGRAM(s) Oral at bedtime  chlorhexidine 2% Cloths 1 Application(s) Topical daily  fluticasone propionate 50 MICROgram(s)/spray Nasal Spray 1 Spray(s) Both Nostrils two times a day  losartan 50 milliGRAM(s) Oral daily  niacin  milliGRAM(s) Oral at bedtime  sodium chloride 0.9%. 1000 milliLiter(s) (75 mL/Hr) IV Continuous <Continuous>  ticagrelor 90 milliGRAM(s) Oral every 12 hours    MEDICATIONS  (PRN):  acetaminophen     Tablet .. 650 milliGRAM(s) Oral every 6 hours PRN Temp greater or equal to 38C (100.4F), Mild Pain (1 - 3)  aluminum hydroxide/magnesium hydroxide/simethicone Suspension 30 milliLiter(s) Oral every 4 hours PRN Dyspepsia  melatonin 3 milliGRAM(s) Oral at bedtime PRN Insomnia  ondansetron Injectable 4 milliGRAM(s) IV Push every 8 hours PRN Nausea and/or Vomiting      CAPILLARY BLOOD GLUCOSE        I&O's Summary    26 Mar 2024 07:01  -  27 Mar 2024 07:00  --------------------------------------------------------  IN: 480 mL / OUT: 0 mL / NET: 480 mL        PHYSICAL EXAM:  Vital Signs Last 24 Hrs  T(C): 36.7 (27 Mar 2024 08:36), Max: 36.8 (26 Mar 2024 23:54)  T(F): 98.1 (27 Mar 2024 08:36), Max: 98.2 (26 Mar 2024 23:54)  HR: 72 (27 Mar 2024 08:36) (64 - 80)  BP: 174/78 (27 Mar 2024 08:36) (145/67 - 174/78)  BP(mean): --  RR: 18 (27 Mar 2024 08:36) (15 - 18)  SpO2: 95% (27 Mar 2024 08:36) (94% - 96%)    Parameters below as of 27 Mar 2024 08:36  Patient On (Oxygen Delivery Method): room air      GENERAL: NAD, well-developed  HEAD:  Atraumatic, Normocephalic  EYES: EOMI, PERRLA, conjunctiva and sclera clear  NECK: Supple, No JVD  CHEST/LUNG: Clear to auscultation bilaterally; No wheeze  HEART: Regular rate and rhythm; No murmurs, rubs, or gallops  ABDOMEN: Soft, Nontender, Nondistended; Bowel sounds present  EXTREMITIES:  2+ Peripheral Pulses, No clubbing, cyanosis, or edema  NEUROLOGY: AAOx3; non-focal  SKIN: No rashes or lesions    LABS:                        12.0   8.13  )-----------( 219      ( 27 Mar 2024 07:28 )             36.1     03-26    144  |  109<H>  |  15  ----------------------------<  93  4.0   |  22  |  1.18    Ca    9.4      26 Mar 2024 06:50      PTT - ( 26 Mar 2024 06:50 )  PTT:30.1 sec      Urinalysis Basic - ( 26 Mar 2024 06:50 )    Color: x / Appearance: x / SG: x / pH: x  Gluc: 93 mg/dL / Ketone: x  / Bili: x / Urobili: x   Blood: x / Protein: x / Nitrite: x   Leuk Esterase: x / RBC: x / WBC x   Sq Epi: x / Non Sq Epi: x / Bacteria: x        RADIOLOGY & ADDITIONAL TESTS:    Imaging Personally Reviewed:    Consultant(s) Notes Reviewed:      Care Discussed with Consultants/Other Providers:

## 2024-03-27 NOTE — PROGRESS NOTE ADULT - PROBLEM SELECTOR PLAN 3
change to high intensity statin 80mg   c/w niacin

## 2024-03-27 NOTE — PROGRESS NOTE ADULT - SUBJECTIVE AND OBJECTIVE BOX
DATE OF SERVICE: 03-27-24 @ 17:47    Patient is a 64y old  Male who presents with a chief complaint of chest pain (27 Mar 2024 09:54)      INTERVAL HISTORY: Feels ok.     REVIEW OF SYSTEMS:  CONSTITUTIONAL: No weakness  EYES/ENT: No visual changes;  No throat pain   NECK: No pain or stiffness  RESPIRATORY: No cough, wheezing; No shortness of breath  CARDIOVASCULAR: No chest pain or palpitations  GASTROINTESTINAL: No abdominal  pain. No nausea, vomiting, or hematemesis  GENITOURINARY: No dysuria, frequency or hematuria  NEUROLOGICAL: No stroke like symptoms  SKIN: No rashes    TELEMETRY Personally reviewed: SR 60-70  	  MEDICATIONS:  losartan 50 milliGRAM(s) Oral daily        PHYSICAL EXAM:  T(C): 36.8 (03-27-24 @ 12:01), Max: 36.8 (03-26-24 @ 23:54)  HR: 70 (03-27-24 @ 12:01) (64 - 73)  BP: 158/72 (03-27-24 @ 12:01) (148/79 - 174/78)  RR: 18 (03-27-24 @ 12:01) (15 - 18)  SpO2: 96% (03-27-24 @ 12:01) (94% - 96%)  Wt(kg): --  I&O's Summary    26 Mar 2024 07:01  -  27 Mar 2024 07:00  --------------------------------------------------------  IN: 480 mL / OUT: 0 mL / NET: 480 mL    27 Mar 2024 07:01  -  27 Mar 2024 17:47  --------------------------------------------------------  IN: 440 mL / OUT: 0 mL / NET: 440 mL          Appearance: In no distress	  HEENT:    PERRL, EOMI	  Cardiovascular:  S1 S2, No JVD  Respiratory: Lungs clear to auscultation	  Gastrointestinal:  Soft, Non-tender, + BS	  Vascularature:  No edema of LE  Psychiatric: Appropriate affect   Neuro: no acute focal deficits                               12.0   8.13  )-----------( 219      ( 27 Mar 2024 07:28 )             36.1     03-26    144  |  109<H>  |  15  ----------------------------<  93  4.0   |  22  |  1.18    Ca    9.4      26 Mar 2024 06:50          Labs personally reviewed      ASSESSMENT/PLAN: 	    65 yo M with HTN, HLD, p/w chest pain ruled in for NSTEMI    Problem/Plan - 1:  ·  Problem: NSTEMI (non-ST elevation myocardial infarction).   ·  Plan: aspirin and brilinta 90mg q12hr  - TTE revealing EF 65%, no regional wall abnormalities   - trend troponins to peak  - tele monitoring.  - s/p [facility] x2 of pLCx and pLAD     Problem/Plan - 2:  ·  Problem: Hypertension.   ·  Plan: on irbesartan 150mg at home, here equiv is losartan 50mg, hold thiazide.    Problem/Plan - 3:  ·  Problem: Hyperlipidemia.   ·  Plan: change to high intensity statin 80mg   c/w niacin.          Ana Lee, CARLIE-NP   Asim Vance DO [facility]  Cardiovascular Medicine  [address] [place] [address], [address]  Available through call or text on [employer] TEAMs  Office: [phone]   DATE OF SERVICE: 03-27-24 @ 17:47    Patient is a 64y old  Male who presents with a chief complaint of chest pain (27 Mar 2024 09:54)      INTERVAL HISTORY: Feels ok.     REVIEW OF SYSTEMS:  CONSTITUTIONAL: No weakness  EYES/ENT: No visual changes;  No throat pain   NECK: No pain or stiffness  RESPIRATORY: No cough, wheezing; No shortness of breath  CARDIOVASCULAR: No chest pain or palpitations  GASTROINTESTINAL: No abdominal  pain. No nausea, vomiting, or hematemesis  GENITOURINARY: No dysuria, frequency or hematuria  NEUROLOGICAL: No stroke like symptoms  SKIN: No rashes    TELEMETRY Personally reviewed: SR 60-70  	  MEDICATIONS:  losartan 50 milliGRAM(s) Oral daily        PHYSICAL EXAM:  T(C): 36.8 (03-27-24 @ 12:01), Max: 36.8 (03-26-24 @ 23:54)  HR: 70 (03-27-24 @ 12:01) (64 - 73)  BP: 158/72 (03-27-24 @ 12:01) (148/79 - 174/78)  RR: 18 (03-27-24 @ 12:01) (15 - 18)  SpO2: 96% (03-27-24 @ 12:01) (94% - 96%)  Wt(kg): --  I&O's Summary    26 Mar 2024 07:01  -  27 Mar 2024 07:00  --------------------------------------------------------  IN: 480 mL / OUT: 0 mL / NET: 480 mL    27 Mar 2024 07:01  -  27 Mar 2024 17:47  --------------------------------------------------------  IN: 440 mL / OUT: 0 mL / NET: 440 mL          Appearance: In no distress	  HEENT:    PERRL, EOMI	  Cardiovascular:  S1 S2, No JVD  Respiratory: Lungs clear to auscultation	  Gastrointestinal:  Soft, Non-tender, + BS	  Vascularature:  No edema of LE  Psychiatric: Appropriate affect   Neuro: no acute focal deficits                               12.0   8.13  )-----------( 219      ( 27 Mar 2024 07:28 )             36.1     03-26    144  |  109<H>  |  15  ----------------------------<  93  4.0   |  22  |  1.18    Ca    9.4      26 Mar 2024 06:50          Labs personally reviewed      ASSESSMENT/PLAN: 	    63 yo M with HTN, HLD, p/w chest pain ruled in for NSTEMI    Problem/Plan - 1:  ·  Problem: NSTEMI (non-ST elevation myocardial infarction).   ·  Plan: aspirin and brilinta 90mg q12hr  - TTE revealing EF 65%, no regional wall abnormalities   - trend troponins to peak  - tele monitoring.  - s/p [facility] x2 of pLCx and pLAD     Problem/Plan - 2:  ·  Problem: Hypertension.   ·  Plan: on irbesartan 150mg at home, here equiv is losartan 50mg, hold thiazide.    Problem/Plan - 3:  ·  Problem: Hyperlipidemia.   ·  Plan: change to high intensity statin 80mg   c/w niacin.          Ana Lee, CARLIE-NP   Asim Vance DO [facility]  Cardiovascular Medicine  [address] [place] [address], [address]  Available through call or text on [employer] TEAM[employer]  Office: [phone]

## 2024-03-28 RX ORDER — TICAGRELOR 90 MG/1
90 TABLET ORAL
Refills: 0 | Status: ACTIVE | COMMUNITY
Start: 2024-03-28